# Patient Record
Sex: FEMALE | Employment: OTHER | URBAN - METROPOLITAN AREA
[De-identification: names, ages, dates, MRNs, and addresses within clinical notes are randomized per-mention and may not be internally consistent; named-entity substitution may affect disease eponyms.]

---

## 2024-01-29 ENCOUNTER — MEDICAL CORRESPONDENCE (OUTPATIENT)
Dept: HEALTH INFORMATION MANAGEMENT | Facility: CLINIC | Age: 74
End: 2024-01-29

## 2024-02-26 ENCOUNTER — OFFICE VISIT (OUTPATIENT)
Dept: ANESTHESIOLOGY | Facility: CLINIC | Age: 74
End: 2024-02-26
Payer: COMMERCIAL

## 2024-02-26 VITALS — HEART RATE: 64 BPM | SYSTOLIC BLOOD PRESSURE: 159 MMHG | DIASTOLIC BLOOD PRESSURE: 65 MMHG | OXYGEN SATURATION: 95 %

## 2024-02-26 DIAGNOSIS — M54.16 LUMBAR RADICULOPATHY: Primary | ICD-10-CM

## 2024-02-26 PROCEDURE — 99204 OFFICE O/P NEW MOD 45 MIN: CPT | Performed by: ANESTHESIOLOGY

## 2024-02-26 ASSESSMENT — PAIN SCALES - GENERAL: PAINLEVEL: MODERATE PAIN (4)

## 2024-02-26 NOTE — LETTER
2/26/2024       RE: Beryl Mccoy  [address] [facility] [address]     Dear Colleague,    Thank you for referring your patient, Beryl Mccoy, to the [facility] FOR COMPREHENSIVE PAIN MANAGEMENT [facility]. Please see a copy of my visit note below.      Pain Clinic New Patient Consult Note:    Referring Provider: No ref. provider found dr. Lc Robles  Primary care provider: Vinicius Sales.    Beryl Mccoy is a 73 year old y.o. old female who presents to the pain clinic with low back and leg pain with ambulation.     HPI:  Patient Supplied Answers To the  Pain Questionnaire      2/26/2024     3:26 PM   [facility] -  Patient Entered Questionnaire/Answers   What number best describes your pain right now:  0 = No pain  to  10 = Worst pain imaginable 4   How would you describe the pain burning    sharp    dull, aching    other   Which of the following worsen your pain lying down    standing    walking    exercise   Which of the following improve or reduce your pain sitting    medication   What number best describes your average pain for the past week:  0 = No pain  to  10 = Worst pain imaginable 4   What number best describes your LOWEST pain in past 24 hours:  0 = No pain  to  10 = Worst pain imaginable 3   What number best describes your WORST pain in past 24 hours:  0 = No pain  to  10 = Worst pain imaginable 5   What non-medicine treatments have you already had for your pain physical therapy    spine injections (shots)       She was referred by Dr. Robles for severe spinal stenosis.   She is not able to walk far. She is able to walk about 3/4 of a block. She has to stop. She sits and pain is relieved. The patient reports that walking as her back and legs get tired out. She has had multiple falls over the last year and is very careful in avoiding falls. She thinks that the problem has been going on for years. She was  referred to the [facility] spine surgery clinic by Dr. Robles for severe L2-3 stenosis because of h/o AF and CABG.     Pain treatments:    Herbal medicines: never  Physical therapy: [facility]  Chiropractor: never  Pain physician: Mervat LECHUGA with Dr. Robles 1/4/2024, improved for 1 week. She thinks that the injection helped 75% for 1 week.  Surgery: none  Biofeedback: none  Acupuncture: none    Tests/Imaging reviewed with the patient:    MRI Lumbar spine 1/26/2024  T11-T12: Mild disc bulge. The neural foramen are patent.     T12-L1: The spinal canal and left neural foramen are patent. Facet hypertrophy causes mild right neural foraminal stenosis.     L1-2: Mild disc bulge flattens the ventral thecal sac eccentric to the left. Mild facet hypertrophy. The neural foramen are patent.     L2-3: Grade 1 retrolisthesis of L2 on L3. Disc uncovering/posterior disc bulge with caudally directed right central disc extrusion effaces the ventral thecal sac causing severe spinal canal stenosis. Mild facet hypertrophy. Mild to moderate left neural foraminal stenosis. The right neural foramen is patent.     L3-4: Mild disc bulge with small right subarticular disc protrusion effaces the ventral thecal sac causing narrowing of the right lateral recess and slight dorsal displacement of the traversing right L4 nerve root. Mild to moderate right neural foraminal stenosis. The left neural foramen is patent.     L4-5: Mild disc bulge effaces the ventral thecal sac. Increased to advanced facet hypertrophy. The neural foramen are patent.      L5-S1: Partial lumbarization of L5 on the right with pseudoarthrosis. The spinal canal and right neural foramen are patent. Moderate proximal left neural foraminal stenosis.     IMPRESSION:    1. New severe spinal canal stenosis at the L2-L3 level due to grade 1 retrolisthesis of L2 on L3 combined with a posterior disc bulge and caudally directed right central disc extrusion.   2. Mild disc bulges small  right subarticular disc protrusion effaces the ventral thecal sac at the L3-L4 level causing narrowing of the right lateral recess and increased slight dorsal displacement of the traversing right L4 nerve root.   3. Additional degenerative changes as detailed above.     Significant Medical History:   No past medical history on file.       Past Surgical History:  No past surgical history on file.       Family History:  No family history on file.       Social History:  Social History     Socioeconomic History    Marital status: Single     Spouse name: Not on file    Number of children: Not on file    Years of education: Not on file    Highest education level: Not on file   Occupational History    Not on file   Tobacco Use    Smoking status: Former     Types: Cigarettes    Smokeless tobacco: Never   Substance and Sexual Activity    Alcohol use: Not on file    Drug use: Not on file    Sexual activity: Not on file   Other Topics Concern    Not on file   Social History Narrative    Not on file     Social Determinants of Health     Financial Resource Strain: Not on file   Food Insecurity: Not on file   Transportation Needs: Not on file   Physical Activity: Not on file   Stress: Not on file   Social Connections: Not on file   Interpersonal Safety: Not on file   Housing Stability: Not on file     Social History     Social History Narrative    Not on file          Allergies:  No Known Allergies    Current Medications:   No current outpatient medications on file.          Current Pain Medications:  Medications related to Pain Management (From now, onward)      None               Blood thinner:    plavix    Work History:    Current work status: retired. 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      Physical Exam:     Vitals:    02/26/24 1536   BP: (!) 159/65   BP Location: Right arm   Patient Position: Chair   Cuff Size: Adult Large   Pulse: 64   SpO2: 95%       General Appearance: No distress, seated comfortably  Mood:  "Euthymic  HE ENT: Non constricted pupils  Respiratory: Non labored breathing  Skin: No rashes over exposed skin  MS: LE strength 5/5  Neuro: intact to light touch LE   Gait: non antalgic, ambulates with walker assistance    Laboratory results:  No results for input(s): \"NA\", \"POTASSIUM\", \"CHLORIDE\", \"CO2\", \"ANIONGAP\", \"GLC\", \"BUN\", \"CR\", \"MARLENY\" in the last 30501 hours.    CBC RESULTS: No results for input(s): \"WBC\", \"RBC\", \"HGB\", \"HCT\", \"MCV\", \"MCH\", \"MCHC\", \"RDW\", \"PLT\" in the last 85501 hours.      Imaging:       ASSESSMENT AND PLAN:     Encounter Diagnosis:    Lumbar stenosis and neurogenic claudication  L2-3 stenosis  Lumbar spondylolisthesis    Beryl Mccoy is a 73 year old y.o. old female who presents to the pain clinic with low back and neck pain    I have summarized the patient s past medical history, discussed their clinical findings and the potential differential diagnosis with the patient. Significant past medical history pertinent to the patient s current condition includes multiple falls, pain in the back and legs with ambulation.  The differential diagnosis discussed with the patient are listed above. I have discussed anatomy and possible sources of the pain using models and/or pictures (diagrams). I have discussed multi- disciplinary pain management options withthe patient as pertaining to their case as detailed above. The pain management options we discussed included, but were not limited to the recommendations below.  I also discussed with patient the risks, benefits and alternatives to each pain management option.  All of the patient s questions and concerns were answered to the best of my ability.    RECOMMENDATIONS:     1. Procedure: We are scheduling the patient for L3-4 interlaminar epidural steroid injection with fluoroscopy can be considered once the patient meets with neurosurgery in the procedure suite. Risks/benefits/alternatives were discussed.     I also discussed with the patient that " the possible risks involved with interventional treatment included, but are not limited to, no pain control, worsened pain, stroke,seizure, spinal headache, allergic reactions, introduction of infection or bleeding which may lead to emergent spine surgery, nerve damage, paralysis oreven death.    2. Physical therapy: I have refered the patient for outpatient physical therapy for stretching, strengthening and home exercise program for myofascial neck and low back pain. The patient will also discuss spine care and posture. Pool therapy and stretches can be considered if available.    3. We are contacting neurosurgery to accommodate the patient for a sooner appointment as the visit in the pain clinic was a scheduling error.     Follow up: after neurosurgery    I personally spoke to neurosurgery coordinator to try to facilitate an earlier consultation for the patient in their clinic.         Again, thank you for allowing me to participate in the care of your patient.      Sincerely,    Mayra Chatman MD

## 2024-02-26 NOTE — PROGRESS NOTES
Pain Clinic New Patient Consult Note:    Referring Provider: No ref. provider found dr. Lc Robles  Primary care provider: Vinicius Sales.    Beryl Mccoy is a 73 year old y.o. old female who presents to the pain clinic with low back and leg pain with ambulation.     HPI:  Patient Supplied Answers To the  Pain Questionnaire      2/26/2024     3:26 PM    Pain -  Patient Entered Questionnaire/Answers   What number best describes your pain right now:  0 = No pain  to  10 = Worst pain imaginable 4   How would you describe the pain burning    sharp    dull, aching    other   Which of the following worsen your pain lying down    standing    walking    exercise   Which of the following improve or reduce your pain sitting    medication   What number best describes your average pain for the past week:  0 = No pain  to  10 = Worst pain imaginable 4   What number best describes your LOWEST pain in past 24 hours:  0 = No pain  to  10 = Worst pain imaginable 3   What number best describes your WORST pain in past 24 hours:  0 = No pain  to  10 = Worst pain imaginable 5   What non-medicine treatments have you already had for your pain physical therapy    spine injections (shots)       She was referred by Dr. Robles for severe spinal stenosis.   She is not able to walk far. She is able to walk about 3/4 of a block. She has to stop. She sits and pain is relieved. The patient reports that walking as her back and legs get tired out. She has had multiple falls over the last year and is very careful in avoiding falls. She thinks that the problem has been going on for years. She was referred to the [facility] spine surgery clinic by Dr. Robles for severe L2-3 stenosis because of h/o AF and CABG.     Pain treatments:    Herbal medicines: never  Physical therapy: [facility]  Chiropractor: never  Pain physician: Mervat LECHUGA with Dr. Robles 1/4/2024, improved for 1 week. She thinks that the injection helped 75% for 1  week.  Surgery: none  Biofeedback: none  Acupuncture: none    Tests/Imaging reviewed with the patient:    MRI Lumbar spine 1/26/2024  T11-T12: Mild disc bulge. The neural foramen are patent.     T12-L1: The spinal canal and left neural foramen are patent. Facet hypertrophy causes mild right neural foraminal stenosis.     L1-2: Mild disc bulge flattens the ventral thecal sac eccentric to the left. Mild facet hypertrophy. The neural foramen are patent.     L2-3: Grade 1 retrolisthesis of L2 on L3. Disc uncovering/posterior disc bulge with caudally directed right central disc extrusion effaces the ventral thecal sac causing severe spinal canal stenosis. Mild facet hypertrophy. Mild to moderate left neural foraminal stenosis. The right neural foramen is patent.     L3-4: Mild disc bulge with small right subarticular disc protrusion effaces the ventral thecal sac causing narrowing of the right lateral recess and slight dorsal displacement of the traversing right L4 nerve root. Mild to moderate right neural foraminal stenosis. The left neural foramen is patent.     L4-5: Mild disc bulge effaces the ventral thecal sac. Increased to advanced facet hypertrophy. The neural foramen are patent.      L5-S1: Partial lumbarization of L5 on the right with pseudoarthrosis. The spinal canal and right neural foramen are patent. Moderate proximal left neural foraminal stenosis.     IMPRESSION:    1. New severe spinal canal stenosis at the L2-L3 level due to grade 1 retrolisthesis of L2 on L3 combined with a posterior disc bulge and caudally directed right central disc extrusion.   2. Mild disc bulges small right subarticular disc protrusion effaces the ventral thecal sac at the L3-L4 level causing narrowing of the right lateral recess and increased slight dorsal displacement of the traversing right L4 nerve root.   3. Additional degenerative changes as detailed above.     Significant Medical History:   No past medical history on  "file.       Past Surgical History:  No past surgical history on file.       Family History:  No family history on file.       Social History:  Social History     Socioeconomic History    Marital status: Single     Spouse name: Not on file    Number of children: Not on file    Years of education: Not on file    Highest education level: Not on file   Occupational History    Not on file   Tobacco Use    Smoking status: Former     Types: Cigarettes    Smokeless tobacco: Never   Substance and Sexual Activity    Alcohol use: Not on file    Drug use: Not on file    Sexual activity: Not on file   Other Topics Concern    Not on file   Social History Narrative    Not on file     Social Determinants of Health     Financial Resource Strain: Not on file   Food Insecurity: Not on file   Transportation Needs: Not on file   Physical Activity: Not on file   Stress: Not on file   Social Connections: Not on file   Interpersonal Safety: Not on file   Housing Stability: Not on file     Social History     Social History Narrative    Not on file          Allergies:  No Known Allergies    Current Medications:   No current outpatient medications on file.          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      Physical Exam:     Vitals:    02/26/24 1536   BP: (!) 159/65   BP Location: Right arm   Patient Position: Chair   Cuff Size: Adult Large   Pulse: 64   SpO2: 95%       General Appearance: No distress, seated comfortably  Mood: Euthymic  HE ENT: Non constricted pupils  Respiratory: Non labored breathing  Skin: No rashes over exposed skin  MS: LE strength 5/5  Neuro: intact to light touch LE   Gait: non antalgic, ambulates with walker assistance    Laboratory results:  No results for input(s): \"NA\", \"POTASSIUM\", \"CHLORIDE\", \"CO2\", \"ANIONGAP\", \"GLC\", \"BUN\", " "\"CR\", \"MARLENY\" in the last 87249 hours.    CBC RESULTS: No results for input(s): \"WBC\", \"RBC\", \"HGB\", \"HCT\", \"MCV\", \"MCH\", \"MCHC\", \"RDW\", \"PLT\" in the last 28161 hours.      Imaging:       ASSESSMENT AND PLAN:     Encounter Diagnosis:    Lumbar stenosis and neurogenic claudication  L2-3 stenosis  Lumbar spondylolisthesis    Beryl Mccoy is a 73 year old y.o. old female who presents to the pain clinic with low back and neck pain    I have summarized the patient s past medical history, discussed their clinical findings and the potential differential diagnosis with the patient. Significant past medical history pertinent to the patient s current condition includes multiple falls, pain in the back and legs with ambulation.  The differential diagnosis discussed with the patient are listed above. I have discussed anatomy and possible sources of the pain using models and/or pictures (diagrams). I have discussed multi- disciplinary pain management options withthe patient as pertaining to their case as detailed above. The pain management options we discussed included, but were not limited to the recommendations below.  I also discussed with patient the risks, benefits and alternatives to each pain management option.  All of the patient s questions and concerns were answered to the best of my ability.    RECOMMENDATIONS:     1. Procedure: We are scheduling the patient for L3-4 interlaminar epidural steroid injection with fluoroscopy can be considered once the patient meets with neurosurgery in the procedure suite. Risks/benefits/alternatives were discussed.     I also discussed with the patient that the possible risks involved with interventional treatment included, but are not limited to, no pain control, worsened pain, stroke,seizure, spinal headache, allergic reactions, introduction of infection or bleeding which may lead to emergent spine surgery, nerve damage, paralysis oreven death.    2. Physical therapy: I have refered " the patient for outpatient physical therapy for stretching, strengthening and home exercise program for myofascial neck and low back pain. The patient will also discuss spine care and posture. Pool therapy and stretches can be considered if available.    3. We are contacting neurosurgery to accommodate the patient for a sooner appointment as the visit in the pain clinic was a scheduling error.     Follow up: after neurosurgery    I personally spoke to neurosurgery coordinator to try to facilitate an earlier consultation for the patient in their clinic.

## 2024-02-26 NOTE — PATIENT INSTRUCTIONS
Referrals:    Physical Therapy Referral placed-   If you have not heard from the scheduling office within 2 business days, please call [phone] for all locations       Recommended Follow up:      Follow up as needed after Neurosurgery Consult.           To speak with a nurse, schedule/reschedule/cancel a clinic appointment, or request a medication refill call: [phone]    You can also reach us by AIM: [email or web address]

## 2024-02-27 ENCOUNTER — CARE COORDINATION (OUTPATIENT)
Dept: NEUROSURGERY | Facility: CLINIC | Age: 74
End: 2024-02-27
Payer: COMMERCIAL

## 2024-02-27 DIAGNOSIS — M48.061 LUMBAR STENOSIS: Primary | ICD-10-CM

## 2024-02-27 NOTE — PROGRESS NOTES
Writer spoke [facility] for patient imaging to be pushed to [facility], reports in Care Everywhere.     Donita Anthony LPN  Neurosurgery

## 2024-02-28 ENCOUNTER — TELEPHONE (OUTPATIENT)
Dept: NEUROSURGERY | Facility: CLINIC | Age: 74
End: 2024-02-28
Payer: COMMERCIAL

## 2024-02-28 NOTE — TELEPHONE ENCOUNTER
SPINE PATIENTS - NEW PROTOCOL PREVISIT    RECORDS RECEIVED FROM: Care Everywhere   REASON FOR VISIT: Lumbar stenosis; (from [facility]); Epic/Needs images pushed form [facility]; XR prior   PROVIDER: Radha Davidson MD   DATE OF APPT: 2/29/24 @ 8:15 am    NOTES (FOR ALL VISITS) STATUS DETAILS   OFFICE NOTE from referring provider Internal 2/26/24 Mayra Chatman MD @[facility]     OFFICE NOTE from other specialist Care Everywhere 2/9/24, 12/15/23 Lc Robles MD  @[facility]     MEDICATION LIST Care Everywhere    IMAGING  (FOR ALL VISITS)     MRI (HEAD, NECK, SPINE) Received [facility]  1/31/24 MR Thoracic Spine  1/26/24 MR Lumbar Spine     XRAY (SPINE) *NEUROSURGERY* In process [facility]  Scheduled 2/29/24 XR EOS Total Body     CT (HEAD, NECK, SPINE) Received [facility]  5/31/22 CT Lumbar Spine

## 2024-02-29 ENCOUNTER — ANCILLARY PROCEDURE (OUTPATIENT)
Dept: GENERAL RADIOLOGY | Facility: CLINIC | Age: 74
End: 2024-02-29
Attending: NEUROLOGICAL SURGERY
Payer: COMMERCIAL

## 2024-02-29 ENCOUNTER — OFFICE VISIT (OUTPATIENT)
Dept: NEUROSURGERY | Facility: CLINIC | Age: 74
End: 2024-02-29
Payer: COMMERCIAL

## 2024-02-29 ENCOUNTER — PRE VISIT (OUTPATIENT)
Dept: NEUROSURGERY | Facility: CLINIC | Age: 74
End: 2024-02-29

## 2024-02-29 VITALS
DIASTOLIC BLOOD PRESSURE: 74 MMHG | SYSTOLIC BLOOD PRESSURE: 151 MMHG | WEIGHT: 249 LBS | BODY MASS INDEX: 44.12 KG/M2 | HEART RATE: 56 BPM | RESPIRATION RATE: 16 BRPM | OXYGEN SATURATION: 96 % | HEIGHT: 63 IN

## 2024-02-29 DIAGNOSIS — M51.26 LUMBAR DISC HERNIATION: ICD-10-CM

## 2024-02-29 DIAGNOSIS — E66.01 MORBID OBESITY WITH BMI OF 40.0-44.9, ADULT (H): Primary | ICD-10-CM

## 2024-02-29 DIAGNOSIS — R27.0 ATAXIA: ICD-10-CM

## 2024-02-29 DIAGNOSIS — M48.061 LUMBAR STENOSIS: ICD-10-CM

## 2024-02-29 PROCEDURE — 99204 OFFICE O/P NEW MOD 45 MIN: CPT | Performed by: NEUROLOGICAL SURGERY

## 2024-02-29 PROCEDURE — 77073 BONE LENGTH STUDIES: CPT | Performed by: STUDENT IN AN ORGANIZED HEALTH CARE EDUCATION/TRAINING PROGRAM

## 2024-02-29 PROCEDURE — 72082 X-RAY EXAM ENTIRE SPI 2/3 VW: CPT | Performed by: STUDENT IN AN ORGANIZED HEALTH CARE EDUCATION/TRAINING PROGRAM

## 2024-02-29 RX ORDER — HYDRALAZINE HYDROCHLORIDE 50 MG/1
50 TABLET, FILM COATED ORAL
COMMUNITY
Start: 2023-12-22 | End: 2024-12-21

## 2024-02-29 RX ORDER — LOSARTAN POTASSIUM 100 MG/1
100 TABLET ORAL DAILY
COMMUNITY
Start: 2024-01-30

## 2024-02-29 RX ORDER — TORSEMIDE 20 MG/1
20 TABLET ORAL DAILY
COMMUNITY
Start: 2024-01-30 | End: 2025-01-29

## 2024-02-29 RX ORDER — SPIRONOLACTONE 25 MG/1
1 TABLET ORAL DAILY
COMMUNITY
Start: 2023-12-07 | End: 2024-12-06

## 2024-02-29 RX ORDER — LAMOTRIGINE 100 MG/1
1.5 TABLET ORAL DAILY
COMMUNITY
Start: 2024-02-13

## 2024-02-29 RX ORDER — LETROZOLE 2.5 MG/1
2.5 TABLET, FILM COATED ORAL DAILY
COMMUNITY
Start: 2024-02-19

## 2024-02-29 RX ORDER — ATORVASTATIN CALCIUM 80 MG/1
1 TABLET, FILM COATED ORAL DAILY
COMMUNITY
Start: 2023-01-24

## 2024-02-29 RX ORDER — SERTRALINE HYDROCHLORIDE 100 MG/1
100 TABLET, FILM COATED ORAL DAILY
COMMUNITY
Start: 2024-02-13

## 2024-02-29 RX ORDER — SOTALOL HYDROCHLORIDE 120 MG/1
120 TABLET ORAL
COMMUNITY
Start: 2023-07-17 | End: 2024-07-16

## 2024-02-29 RX ORDER — POTASSIUM CHLORIDE 1500 MG/1
0.5 TABLET, EXTENDED RELEASE ORAL DAILY
COMMUNITY
Start: 2023-12-07

## 2024-02-29 NOTE — PATIENT INSTRUCTIONS
Dr. Davidson placed referrals for Weight Management (your goal BMI is 35, weight about 200 pounds for surgery consideration for your disc herniation), physical therapy to help your walking, and Neurology evaluation to evaluate your imbalance. They will call you to schedule.

## 2024-02-29 NOTE — PROGRESS NOTES
"    Neurosurgery Clinic Note    Chief Complaint: \"I feel wobbly\"    History of Present Illness:  It was a pleasure to evaluate Beryl Mccoy in clinic today   Beryl Mccoy is a 73 year old female presenting with sensation that she is losing her balance when standing and also feels \"wobbly\" when walking, this has been present for about 3 years. She had numbness on bottoms of feet since having chemotherapy for breast cancer about 5 years ago per her report. She has occasional shooting pain in both thighs but this does not happen daily. She also has chronic pain in thoracolumbar spine, only has had one session of PT which she stopped because she didn't think it helped her. Had an JACQUELINE last month that helped her back pain for about 2 weeks. She has no hand numbness or clumsiness or tingling. Her legs feel heavy if she walks for a while, but her gait imbalance starts immediately upon walking, and is present when she is static/standing. She does not feel any incoordination in her arms.  No prior spine surgery.      Notable history for breast cancer, bipolar disease, paroxysmal atrial fibrillation, obesity  Former smoker        IMAGING per my own measurement and interpretation:  Xrays:EOS 02/29/24  Transitional lumbosacral joint anatomy  No significant sagittal or coronal malalignment          MRI thoracic spine 1/13/24 no significant stenosis and MRI lumbar spine 1/26/24 TRANSITIONAL LUMBAR SACRAL SEGMENT; large right L2-3 disc extrusion with moderately severe central and right lateral recess stenosis; this is chronic based on CT lumbar spine from 2022 which showed calcification in the area of current disc herniation        Resulted Imaging/Labs:  MR Thoracic Spine w/o & w Contrast    Result Date: 2/1/2024  INDICATION: mid back pain, chronic TECHNIQUE:  MRI of the thoracic spine with and without contrast, 10 mL  GADOBUTROL 1 MMOL/ML IV SOLN. COMPARISON:  None.         FINDINGS:  Normal alignment.  Normal cord signal.  " Hemangiomas within the T1, T3, and T10 vertebral bodies. Mild thoracic curve convex right.  Mild disc bulges flatten the ventral thecal sac at the C6-C7 and C7-T1 levels. Facet hypertrophy causes mild right T3-4, T4-T5, T5-T6, and T6-T7 neural foraminal narrowing.  Mild disc bulge effaces the ventral thecal sac at the T10-T11 level.  The visualized paraspinal structures are unremarkable. IMPRESSION:   1. Mild disc bulge effaces the ventral thecal sac at the T10-T11 level. 2. Mild disc bulges flatten the ventral thecal sac at the C6-C7 and C7-T1 levels. 3. Facet hypertrophy causes mild right T3-T4, T4-T5, T5-T6, and T6-T7 neural foraminal narrowing. 4. No abnormal signal or enhancement within the thoracic spinal cord.    MR Lumbar Spine w/o & w Contrast    Result Date: 1/26/2024  INDICATION: evaluation of stenosis   TECHNIQUE:  MRI of the lumbar spine with and without contrast, GADOBUTROL 1 MMOL/ML IV SOLN 10 mL. COMPARISON:  07/08/2014       FINDINGS: Sagittal:  Transitional lumbosacral anatomy with partial sacralization of the labeled L5 segment.  The conus medullaris terminates at the level of the L1 vertebral body. Normal cord signal.  Degenerative disc disease with loss of disc hydration signal and disc space height with associated Modic type II degenerative endplate changes at the L2-L3 level.  Grade 1 retrolisthesis of L2 on L3 is new.  Normal enhancement. Left renal cyst. Axial: T11-T12: Mild disc bulge. The neural foramen are patent. T12-L1: The spinal canal and left neural foramen are patent. Facet hypertrophy causes mild right neural foraminal stenosis. L1-2: Mild disc bulge flattens the ventral thecal sac eccentric to the left. Mild facet hypertrophy. The neural foramen are patent. L2-3: Grade 1 retrolisthesis of L2 on L3. Disc uncovering/posterior disc bulge with caudally directed right central disc extrusion effaces the ventral thecal sac causing severe spinal canal stenosis. Mild facet hypertrophy.  Mild to moderate left neural foraminal stenosis. The right neural foramen is patent. L3-4: Mild disc bulge with small right subarticular disc protrusion effaces the ventral thecal sac causing narrowing of the right lateral recess and slight dorsal displacement of the traversing right L4 nerve root. Mild to moderate right neural foraminal stenosis. The left neural foramen is patent. L4-5: Mild disc bulge effaces the ventral thecal sac. Increased to advanced facet hypertrophy. The neural foramen are patent.   L5-S1: Partial lumbarization of L5 on the right with pseudoarthrosis. The spinal canal and right neural foramen are patent. Moderate proximal left neural foraminal stenosis. IMPRESSION:   1. New severe spinal canal stenosis at the L2-L3 level due to grade 1 retrolisthesis of L2 on L3 combined with a posterior disc bulge and caudally directed right central disc extrusion. 2. Mild disc bulges small right subarticular disc protrusion effaces the ventral thecal sac at the L3-L4 level causing narrowing of the right lateral recess and increased slight dorsal displacement of the traversing right L4 nerve root. 3. Additional degenerative changes as detailed above. Comment: Many lumbar spine MRI findings are so common that while we may have reported their presence, they must be interpreted with caution and in the context of the clinical situation. The frequency of these findings in adults WITHOUT low back pain increases with age and are as follows: disk degeneration (37-96%), disk height loss (24-84%), disk bulge (30-84%), disk protrusion (29-43%), annular fissure (19-29%), and facet degeneration (4-83%). Frequency percentages adapted from Chelo W, Miley PH, William B, et al. AJNR AM J Neuroradiol 2015:36:811-16.  CT Lumbar Spine w/o Contrast    Result Date: 5/31/2022  EXAM: CT LUMBAR SPINE WO IV CONT LOCATION: [facility] DATE/TIME: 5/31/2022 1:06 PM INDICATION: Traumatic lumbar spine injury. COMPARISON:  "None. TECHNIQUE: Routine CT Lumbar Spine without IV contrast. Multiplanar reformats. Dose reduction techniques were used. FINDINGS: VERTEBRA: Transitional L5 vertebral body. Normal vertebral body heights. No fracture or posttraumatic subluxation. CANAL/FORAMINA: Severe L2-L3 spinal canal stenosis largely secondary to a caudally directed right central disc herniation which is partially calcified. Moderate right L3-L4 lateral recess stenosis with mild neural foraminal stenosis due to a chronic disc osteophyte complex. Moderate to severe chronic left L5-S1 neural foraminal stenosis. PARASPINAL: No extraspinal abnormality. IMPRESSION: 1.  No acute abnormality. 2.  Severe L2-L3 spinal canal stenosis largely secondary to a caudally directed right central disc herniation. 3.  Moderate right L3-L4 lateral recess stenosis. 4.  Moderate to severe chronic left L5-S1 neural foraminal stenosis.          Vitamin D:  Vitamin D Deficiency Screening Results:  No results found for: \"VITDT\"  No results found for: \"CMB537\", \"URYR381\", \"[id number]\", \"VITD3\", \"D2VIT\", \"D3VIT\", \"DTOT\", \"[id number]\", \"[id number]\", \"[id number]\", \"[id number]\", \"[id number]\", \"[id number]\"      Nutritional Status:  Estimated body mass index is 44.11 kg/m  as calculated from the following:    Height as of this encounter: 1.6 m (5' 3\").    Weight as of this encounter: 112.9 kg (249 lb).    No results found for: \"ALBUMIN\"    Diabetes Screening:  No results found for: \"A1C\"    Nicotine Usage:    No       Physical Exam   BP (!) 151/74   Pulse 56   Resp 16   Ht 1.6 m (5' 3\")   Wt 112.9 kg (249 lb)   SpO2 96%   BMI 44.11 kg/m      Constitutional: Oriented to person, place, and time. Appears well-developed and well-nourished. Cooperative. No distress.     Neurological: alert and oriented to person, place, and time.   sensory deficit bottoms of feet bilaterally  Gait normal tandem walk, unable to narrow stance and toe-heel walk  B    Reflex Scores: 1+ bilateral " "biceps, brachioradialis, 0 patellar, Achilles  STRENGTH LEFT RIGHT   Deltoid 5 5   Bicep 5 5   Wrist Extensor 5 5   Tricep 5 5   Finger flexion 5 5   Finger abduction 5 5    5 5       Hip Flexion     5     5   Knee Extension 5 5   Ankle Dorsiflexion 5 5   Extensor Hallucis Longus 5 5   Plantar Flexion 5 5   Foot eversion 5 5   Foot inversion 5 5     No Lhermitte's, No Spurling's  No Bo's   No ankle clonus  Unable to tandem walk        Skin: Skin is warm, dry and intact.   Psychiatric: Normal mood and affect. Speech is normal and behavior is normal.        ASSESSMENT:  Beryl Mccoy is a 73 year old female with morbid obesity, L2-3 chronic calcified disc herniation with stenosis, imbalance, history of breast cancer and chemotherapy.    PLAN:  While the Radiology report from MRI 1/26/24 lumbar spine says \"new\" L2-3 stenosis, in fact this is not accurate and the L2-3 disc herniation is chronic, because as far back as 2022 there is a CT scan which shows calcified disc herniation in this region with \"severe\" stenosis on prior CT lumbar spine read of 2022 which I confirmed with imaging review.    The patient has significant modifiable surgical risk factor of morbid obesity, with a current BMI of greater than 44. Because this is a chronic disc herniation present at least since 2022 and likely well before then as it was calcified in 2022, I recommend weight loss to reduce risk of medical and surgical complication prior to consideration of spine surgery. BMI goal of 35 for this patient is weight about 200 pounds.  Patient's daughter discussed with patient that many doctors have previously recommended weight loss and increased activity.    I discussed with patient and daughter that her gait imbalance is not likely due to lumbar spine and there is no thoracic spinal stenosis to explain this either. She does not have any upper extremity symptoms to suggest cervical myelopathy. I recommended Neurology referral for " further evaluation of her sensation that she is going to fall backwards while standing still, and cannot heel-toe walk.    Also referral for gait-based PT.    Referral made for weight management services.  Return to clinic once BMI 35 for further discussion for surgery for disc herniation; we discussed that I would not expect balance improvement with this surgery, and surgery would be for sensation of leg heaviness as well as shooting leg pain.      Radha Davidson MD    [facility] Department of Neurosurgery  Complex Spinal Deformity, Scoliosis, and Minimally Invasive Spine Surgery Specialist  Office: [phone]    2/29/2024      I spent 49 minutes (8:12am-9:01am) in patient care with greater than 50% spent in counseling and/or coordination of care.

## 2024-02-29 NOTE — LETTER
"2/29/2024       RE: Beryl Mccoy  [address] [address]       Dear Colleague,    Thank you for referring your patient, Beryl Mccoy, to the [facility]UROSURG[facility] [place] [facility]. Please see a copy of my visit note below.        Neurosurgery Clinic Note    Chief Complaint: \"I feel wobbly\"    History of Present Illness:  It was a pleasure to evaluate Beryl Mccoy in clinic today   Beryl Mccoy is a 73 year old female presenting with sensation that she is losing her balance when standing and also feels \"wobbly\" when walking, this has been present for about 3 years. She had numbness on bottoms of feet since having chemotherapy for breast cancer about 5 years ago per her report. She has occasional shooting pain in both thighs but this does not happen daily. She also has chronic pain in thoracolumbar spine, only has had one session of PT which she stopped because she didn't think it helped her. Had an JACQUELINE last month that helped her back pain for about 2 weeks. She has no hand numbness or clumsiness or tingling. Her legs feel heavy if she walks for a while, but her gait imbalance starts immediately upon walking, and is present when she is static/standing. She does not feel any incoordination in her arms.  No prior spine surgery.      Notable history for breast cancer, bipolar disease, paroxysmal atrial fibrillation, obesity  Former smoker        IMAGING per my own measurement and interpretation:  Xrays:EOS 02/29/24  Transitional lumbosacral joint anatomy  No significant sagittal or coronal malalignment          MRI thoracic spine 1/13/24 no significant stenosis and MRI lumbar spine 1/26/24 TRANSITIONAL LUMBAR SACRAL SEGMENT; large right L2-3 disc extrusion with moderately severe central and right lateral recess stenosis; this is chronic based on CT lumbar spine from 2022 which showed calcification in the area of current disc " herniation        Resulted Imaging/Labs:  MR Thoracic Spine w/o & w Contrast    Result Date: 2/1/2024  INDICATION: mid back pain, chronic TECHNIQUE:  MRI of the thoracic spine with and without contrast, 10 mL  GADOBUTROL 1 MMOL/ML IV SOLN. COMPARISON:  None.         FINDINGS:  Normal alignment.  Normal cord signal.  Hemangiomas within the T1, T3, and T10 vertebral bodies. Mild thoracic curve convex right.  Mild disc bulges flatten the ventral thecal sac at the C6-C7 and C7-T1 levels. Facet hypertrophy causes mild right T3-4, T4-T5, T5-T6, and T6-T7 neural foraminal narrowing.  Mild disc bulge effaces the ventral thecal sac at the T10-T11 level.  The visualized paraspinal structures are unremarkable. IMPRESSION:   1. Mild disc bulge effaces the ventral thecal sac at the T10-T11 level. 2. Mild disc bulges flatten the ventral thecal sac at the C6-C7 and C7-T1 levels. 3. Facet hypertrophy causes mild right T3-T4, T4-T5, T5-T6, and T6-T7 neural foraminal narrowing. 4. No abnormal signal or enhancement within the thoracic spinal cord.    MR Lumbar Spine w/o & w Contrast    Result Date: 1/26/2024  INDICATION: evaluation of stenosis   TECHNIQUE:  MRI of the lumbar spine with and without contrast, GADOBUTROL 1 MMOL/ML IV SOLN 10 mL. COMPARISON:  07/08/2014       FINDINGS: Sagittal:  Transitional lumbosacral anatomy with partial sacralization of the labeled L5 segment.  The conus medullaris terminates at the level of the L1 vertebral body. Normal cord signal.  Degenerative disc disease with loss of disc hydration signal and disc space height with associated Modic type II degenerative endplate changes at the L2-L3 level.  Grade 1 retrolisthesis of L2 on L3 is new.  Normal enhancement. Left renal cyst. Axial: T11-T12: Mild disc bulge. The neural foramen are patent. T12-L1: The spinal canal and left neural foramen are patent. Facet hypertrophy causes mild right neural foraminal stenosis. L1-2: Mild disc bulge flattens the  ventral thecal sac eccentric to the left. Mild facet hypertrophy. The neural foramen are patent. L2-3: Grade 1 retrolisthesis of L2 on L3. Disc uncovering/posterior disc bulge with caudally directed right central disc extrusion effaces the ventral thecal sac causing severe spinal canal stenosis. Mild facet hypertrophy. Mild to moderate left neural foraminal stenosis. The right neural foramen is patent. L3-4: Mild disc bulge with small right subarticular disc protrusion effaces the ventral thecal sac causing narrowing of the right lateral recess and slight dorsal displacement of the traversing right L4 nerve root. Mild to moderate right neural foraminal stenosis. The left neural foramen is patent. L4-5: Mild disc bulge effaces the ventral thecal sac. Increased to advanced facet hypertrophy. The neural foramen are patent.   L5-S1: Partial lumbarization of L5 on the right with pseudoarthrosis. The spinal canal and right neural foramen are patent. Moderate proximal left neural foraminal stenosis. IMPRESSION:   1. New severe spinal canal stenosis at the L2-L3 level due to grade 1 retrolisthesis of L2 on L3 combined with a posterior disc bulge and caudally directed right central disc extrusion. 2. Mild disc bulges small right subarticular disc protrusion effaces the ventral thecal sac at the L3-L4 level causing narrowing of the right lateral recess and increased slight dorsal displacement of the traversing right L4 nerve root. 3. Additional degenerative changes as detailed above. Comment: Many lumbar spine MRI findings are so common that while we may have reported their presence, they must be interpreted with caution and in the context of the clinical situation. The frequency of these findings in adults WITHOUT low back pain increases with age and are as follows: disk degeneration (37-96%), disk height loss (24-84%), disk bulge (30-84%), disk protrusion (29-43%), annular fissure (19-29%), and facet degeneration (4-83%).  "Frequency percentages adapted from Chelo W, Miley PH, William B, et al. AJNR AM J Neuroradiol 2015:36:811-16.  CT Lumbar Spine w/o Contrast    Result Date: 5/31/2022  EXAM: CT LUMBAR SPINE WO IV CONT LOCATION: [facility] DATE/TIME: 5/31/2022 1:06 PM INDICATION: Traumatic lumbar spine injury. COMPARISON: None. TECHNIQUE: Routine CT Lumbar Spine without IV contrast. Multiplanar reformats. Dose reduction techniques were used. FINDINGS: VERTEBRA: Transitional L5 vertebral body. Normal vertebral body heights. No fracture or posttraumatic subluxation. CANAL/FORAMINA: Severe L2-L3 spinal canal stenosis largely secondary to a caudally directed right central disc herniation which is partially calcified. Moderate right L3-L4 lateral recess stenosis with mild neural foraminal stenosis due to a chronic disc osteophyte complex. Moderate to severe chronic left L5-S1 neural foraminal stenosis. PARASPINAL: No extraspinal abnormality. IMPRESSION: 1.  No acute abnormality. 2.  Severe L2-L3 spinal canal stenosis largely secondary to a caudally directed right central disc herniation. 3.  Moderate right L3-L4 lateral recess stenosis. 4.  Moderate to severe chronic left L5-S1 neural foraminal stenosis.          Vitamin D:  Vitamin D Deficiency Screening Results:  No results found for: \"VITDT\"  No results found for: \"EWS996\", \"GJPB238\", \"GHSH70EEZEQ\", \"VITD3\", \"D2VIT\", \"D3VIT\", \"DTOT\", \"[id number]\", \"[id number]\", \"[id number]\", \"[id number]\", \"[id number]\", \"[id number]\"      Nutritional Status:  Estimated body mass index is 44.11 kg/m  as calculated from the following:    Height as of this encounter: 1.6 m (5' 3\").    Weight as of this encounter: 112.9 kg (249 lb).    No results found for: \"ALBUMIN\"    Diabetes Screening:  No results found for: \"A1C\"    Nicotine Usage:    No       Physical Exam   BP (!) 151/74   Pulse 56   Resp 16   Ht 1.6 m (5' 3\")   Wt 112.9 kg (249 lb)   SpO2 96%   BMI 44.11 kg/m      Constitutional: " "Oriented to person, place, and time. Appears well-developed and well-nourished. Cooperative. No distress.     Neurological: alert and oriented to person, place, and time.   sensory deficit bottoms of feet bilaterally  Gait normal tandem walk, unable to narrow stance and toe-heel walk  B    Reflex Scores: 1+ bilateral biceps, brachioradialis, 0 patellar, Achilles  STRENGTH LEFT RIGHT   Deltoid 5 5   Bicep 5 5   Wrist Extensor 5 5   Tricep 5 5   Finger flexion 5 5   Finger abduction 5 5    5 5       Hip Flexion     5     5   Knee Extension 5 5   Ankle Dorsiflexion 5 5   Extensor Hallucis Longus 5 5   Plantar Flexion 5 5   Foot eversion 5 5   Foot inversion 5 5     No Lhermitte's, No Spurling's  No Bo's   No ankle clonus  Unable to tandem walk        Skin: Skin is warm, dry and intact.   Psychiatric: Normal mood and affect. Speech is normal and behavior is normal.        ASSESSMENT:  Beryl Mccoy is a 73 year old female with morbid obesity, L2-3 chronic calcified disc herniation with stenosis, imbalance, history of breast cancer and chemotherapy.    PLAN:  While the Radiology report from MRI 1/26/24 lumbar spine says \"new\" L2-3 stenosis, in fact this is not accurate and the L2-3 disc herniation is chronic, because as far back as 2022 there is a CT scan which shows calcified disc herniation in this region with \"severe\" stenosis on prior CT lumbar spine read of 2022 which I confirmed with imaging review.    The patient has significant modifiable surgical risk factor of morbid obesity, with a current BMI of greater than 44. Because this is a chronic disc herniation present at least since 2022 and likely well before then as it was calcified in 2022, I recommend weight loss to reduce risk of medical and surgical complication prior to consideration of spine surgery. BMI goal of 35 for this patient is weight about 200 pounds.  Patient's daughter discussed with patient that many doctors have previously recommended " weight loss and increased activity.    I discussed with patient and daughter that her gait imbalance is not likely due to lumbar spine and there is no thoracic spinal stenosis to explain this either. She does not have any upper extremity symptoms to suggest cervical myelopathy. I recommended Neurology referral for further evaluation of her sensation that she is going to fall backwards while standing still, and cannot heel-toe walk.    Also referral for gait-based PT.    Referral made for weight management services.  Return to clinic once BMI 35 for further discussion for surgery for disc herniation; we discussed that I would not expect balance improvement with this surgery, and surgery would be for sensation of leg heaviness as well as shooting leg pain.      I spent 49 minutes (8:12am-9:01am) in patient care with greater than 50% spent in counseling and/or coordination of care.        Again, thank you for allowing me to participate in the care of your patient.      Sincerely,    Radha Davidson MD

## 2024-03-19 ENCOUNTER — VIRTUAL VISIT (OUTPATIENT)
Dept: ENDOCRINOLOGY | Facility: CLINIC | Age: 74
End: 2024-03-19
Attending: NEUROLOGICAL SURGERY
Payer: COMMERCIAL

## 2024-03-19 ENCOUNTER — TELEPHONE (OUTPATIENT)
Dept: ENDOCRINOLOGY | Facility: CLINIC | Age: 74
End: 2024-03-19

## 2024-03-19 VITALS — BODY MASS INDEX: 42.52 KG/M2 | HEIGHT: 63 IN | WEIGHT: 240 LBS

## 2024-03-19 DIAGNOSIS — E66.813 CLASS 3 SEVERE OBESITY WITH SERIOUS COMORBIDITY AND BODY MASS INDEX (BMI) OF 40.0 TO 44.9 IN ADULT, UNSPECIFIED OBESITY TYPE (H): Primary | ICD-10-CM

## 2024-03-19 DIAGNOSIS — R73.03 PRE-DIABETES: ICD-10-CM

## 2024-03-19 DIAGNOSIS — E66.01 CLASS 3 SEVERE OBESITY WITH SERIOUS COMORBIDITY AND BODY MASS INDEX (BMI) OF 40.0 TO 44.9 IN ADULT, UNSPECIFIED OBESITY TYPE (H): Primary | ICD-10-CM

## 2024-03-19 PROBLEM — C77.3 BREAST CANCER METASTASIZED TO AXILLARY LYMPH NODE, LEFT (H): Status: ACTIVE | Noted: 2017-11-03

## 2024-03-19 PROBLEM — F90.0 ATTENTION DEFICIT HYPERACTIVITY DISORDER (ADHD), INATTENTIVE TYPE, MODERATE: Status: ACTIVE | Noted: 2017-07-18

## 2024-03-19 PROBLEM — Z90.12 S/P LEFT MASTECTOMY: Status: ACTIVE | Noted: 2017-10-17

## 2024-03-19 PROBLEM — Z95.1 S/P CABG X 3: Status: ACTIVE | Noted: 2019-06-20

## 2024-03-19 PROBLEM — C50.912 BREAST CANCER METASTASIZED TO AXILLARY LYMPH NODE, LEFT (H): Status: ACTIVE | Noted: 2017-11-03

## 2024-03-19 PROBLEM — H25.13 NUCLEAR SCLEROTIC CATARACT OF BOTH EYES: Status: ACTIVE | Noted: 2020-09-26

## 2024-03-19 PROBLEM — I48.0 PAF (PAROXYSMAL ATRIAL FIBRILLATION) (H): Status: ACTIVE | Noted: 2019-06-14

## 2024-03-19 PROBLEM — I25.10 CORONARY ARTERY DISEASE INVOLVING NATIVE CORONARY ARTERY OF NATIVE HEART WITHOUT ANGINA PECTORIS: Status: ACTIVE | Noted: 2019-06-11

## 2024-03-19 PROBLEM — Z79.01 LONG TERM (CURRENT) USE OF ANTICOAGULANTS: Status: ACTIVE | Noted: 2019-06-20

## 2024-03-19 PROCEDURE — 99205 OFFICE O/P NEW HI 60 MIN: CPT | Mod: 95

## 2024-03-19 ASSESSMENT — PAIN SCALES - GENERAL: PAINLEVEL: MODERATE PAIN (4)

## 2024-03-19 NOTE — TELEPHONE ENCOUNTER
PA Initiation    Medication: ZEPBOUND 2.5 MG/0.5ML SC SOAJ  Insurance Company: [employer] - Phone [phone] Fax [phone]  Pharmacy Filling the Rx: [facility]Y #[id number] - [place], [place] - [address] AT [address] [facility]  Filling Pharmacy Phone: [phone]  Filling Pharmacy Fax: [phone]  Start Date: 3/19/2024

## 2024-03-19 NOTE — PROGRESS NOTES
"Virtual Visit Details    Type of service:  Video Visit   Video Start Time: 12:00PM  Video End Time:1:04PM    Originating Location (pt. Location): Home    Distant Location (provider location):  Off-site  Platform used for Video Visit: AmWell      70 minutes spent by me on the date of the encounter doing chart review, history and exam, documentation and further activities per the note    New Bariatric Surgery Consultation Note    2024    RE: Beryl Mccoy  MR#: [id number]  : 1950      Referring provider:       3/19/2024    10:25 AM   --   Who referred you Radha Davidson       Chief Complaint/Reason for visit: evaluation for possible weight loss surgery    Dear Vinicius Sales MD (General),    I had the pleasure of seeing your patient, Beryl Mccoy, to evaluate her obesity and consider her for possible weight loss surgery. As you know, Beryl Mccoy is 73 year old.  She has a height of 5' 3\", a weight of 240 lbs 0 oz, and calculated Body mass index is 42.51 kg/m .    Assessment & Plan   Problem List Items Addressed This Visit       Class 3 severe obesity with serious comorbidity and body mass index (BMI) of 40.0 to 44.9 in adult, unspecified obesity type (H) - Primary     Overweight onset in childhood. Weight gain started in 20s through smoking cessation. Since then weight gain has been gradual. Lost around 20lbs through breast cancer treatment, but since has regained weight. Previously tried weight watchers, ashlyn HARRIS and increase exercise with minimal weight loss. Weight is currently influenced by hernaited disc that limits mobility. Currently is weight stable. Needs to lose weight for back surgery - Goal BMI<35 or 200lbs.     Comorbidities include QI, HTN, HLD, pre-diabetes, afib, CAD, breast cancer, ADHD, and Bipolar I. History of MI and CABG.     Discussed AOMs to help with weight loss:   - Phentermine contraindicated due to age and hx of HTN, Afib, CAD, MI and CABG.   - Topiramate " could be considered at a low dose if approved by psychiatrist. No hx of kidney stones of disease. GFR >60.   - Naltrexone could be considered in the future. No hx of liver disease. Not taking opioids.   - GLP-1 to be started today. No contraindications. Discussed side effects, risks, and benefits. No hx of pancreatitis. No personal or family hx of MTC or MENII. Concern for cost with medicare insurance. History of prediabetes. Discussed Zepbound coupon or compounded semiglutide.          Relevant Medications    tirzepatide-Weight Management (ZEPBOUND) 2.5 MG/0.5ML prefilled pen    tirzepatide-Weight Management (ZEPBOUND) 5 MG/0.5ML prefilled pen    Other Relevant Orders    Hemoglobin A1c    CBC with platelets    Comprehensive metabolic panel    Lipid panel reflex to direct LDL Fasting    Parathyroid Hormone Intact    Vitamin A    Vitamin B12    Vitamin D Deficiency    Med Therapy Management Referral     Other Visit Diagnoses       Pre-diabetes        Relevant Orders    Hemoglobin A1c           Start Zepbound 2.5mg once weekly for 4 weeks, then increase to 5.0mg once weekly. Consider Wegovy and Saxenda as needed. Consider compounded semaglutide   Bariatric labs ordered   Schedule psych eval   Clearances/letter of support:  - Primary care provider.   - cardiology   - sleep   - Psychiatrist  Schedule Group new. bariatric nutrition class  Dr. Meek in 1 month   [facility] pharmacist in 6 weeks for polypharmacy   Tanesha Santiago in 3 months       HISTORY OF PRESENT ILLNESS:      3/19/2024    10:25 AM   Weight Loss History Reviewed with Patient   How long have you been overweight? Since late 20's to early 40's   What is the most that you have ever weighed 270   What is the most weight you have lost? 30   I have tried the following methods to lose weight Watching portions or calories    Exercise    Weight Watchers    Pre packaged meals ex: Nutrisystem    Slimfast    OTC Medications   I have tried the following weight loss medications?  (Check all that apply) None     Overweight onset at 15yo. Weight gain has been gradual. Was smoking in 20s, and when stopped smoking saw around 10lb weight gain. Since then weight gain has been gradual. Has tried to lose weight through WW, medina HARRIS, and exercise - will lose minimal weight, and then hard to maintain. Was treated for breast cancer and lost to 216lbs, but has regained gradually since. Has also been influenced by back pain/herniated disc, and is nervous to work out. Has been weight stable for the past couple years. Needs to lose 40lb, goal of BMI <35/200lbs for back surgery. Wants to lose weigth as well to help with improve health and prevent comorbidities.     Eating 2 meals a day, but will graze in between. Increase hunger and thoughts of food. Craves sugar. Can get full with large portion sizes, and has a hard time staying full. Loves pizza. Has continued Medina HARRIS meals for all meals. Drinks water, coffee.     Activity - very limited due to herniated disc. Tries to walk, but can only go for 10min.     Has not tried AOMs in the past.       CO-MORBIDITIES OF OBESITY INCLUDE:      3/19/2024    10:25 AM   --   I have the following health issues associated with obesity Pre-Diabetes    Heart Disease    High Blood Pressure    Sleep Apnea    GERD (Reflux)    Lymphedema    Osteoarthritis (joint disease)     HTN - moderately controlled on medications. Followed by cardiology     HLD - controlled with medications. Followed by cariology     QI - on CPAP nightly. Followed by sleep     Afib - on blood thinners. hx of cardioversion. Followed by cardiology    MI, CAD - hx of stents and CABG. Followed by cardiology     GERD - symptoms of chest and abdominal pain. Was on PPI for a few years. No current symptoms.     Breat cancer - treated with chemo, radiation, and left mastectomy. Has been cancer free since 2018. Followed by oncology yearly    History of bleeding or clotting disorder? No    Is patient on biologics  or immunomodulators? No    PAST MEDICAL HISTORY:  Past Medical History:   Diagnosis Date    Anxiety     Atherosclerosis     Cancer (H)     Depressive disorder     Esophageal reflux     Gallbladder problem     History of radiation therapy     Hypertension     Migraines     Nonsenile cataract     Old myocardial infarction 06/07/2003    Other mental problems     Palpitations     Urinary incontinence        PAST SURGICAL HISTORY:  Past Surgical History:   Procedure Laterality Date    ABDOMEN SURGERY      BIOPSY      BREAST SURGERY      CARDIAC SURGERY      CHOLECYSTECTOMY      COSMETIC SURGERY      EYE SURGERY      GENITOURINARY SURGERY      GYN SURGERY      THORACIC SURGERY           FAMILY HISTORY:   No family history on file.    SOCIAL HISTORY:       3/19/2024    10:25 AM   Social History Questions Reviewed With Patient   Which best describes your employment status (select all that apply) I am retired   Which best describes your marital status    Who do you have in your support network that can be available to help you for the first 2 weeks after surgery? 2 daughters, friend   Who can you count on for support throughout your weight loss surgery journey? daughters, friends, family     Retired. Previous was a manager of a tech help desk. Has 2 daughters and 1 son. Good support system.     HABITS:      3/19/2024    10:25 AM   --   How often do you drink alcohol? Never   Do you currently use any of the following Nicotine products? No   Have you ever used any of the following nicotine products? Cigarettes   If you previously used any of these products, what year did you quit? 1977   Have you or are you currently using street drugs or prescription strength medication for which you do not have a prescription for? No   Do you have a history of chemical dependency (alcohol or drug abuse)? No     No cannabis     Currently taking narcotic/opioids No    PSYCHOLOGICAL HISTORY:       3/19/2024    10:25 AM   Psychological  "History Reviewed With Patient   Have you ever attempted suicide? Never.   Have you had thoughts of suicide in the past year? No   Have you ever been hospitalized for mental illness or a suicide attempt? Never.   Do you have a history of chronic pain? Yes   Are you currently being treated for any of the following? (select all that apply) Anxiety    Bipolar    ADHD   Are you currently seeing a therapist or counselor? No   Are you currently seeing a psychiatrist? Yes     Bipolar I and ADHD - Mood is stable on medications. Followed by psychiatrist     ROS:      3/19/2024    10:25 AM   --   Skin Skin fold rashes (groin or other folds)    Leg swelling    Varicose veins   HEENT Headaches    Teeth, dentures, or bridges needing repairs   Musculoskeletal Joint Pain    Back pain    Limited mobility    Swelling of legs    Arthritis   Cardiovascular Shortness of breath with activity    None of the above   Pulmonary Shortness of breath at rest    Shortness of breath with activity    Snoring    Awaken from sleep to catch your breath    People have told me I stop breathing while asleep    Experience morning headaches   Gastrointestinal Heartburn    Constipation   Genitourinary Stress incontinence (losing urine when coughing, sneezing, etc.)   Hematological None of the above   Neurological Migraine headaches   Female only Post-menopausal       EATING BEHAVIORS:      3/19/2024    10:25 AM   --   Have you or anyone else thought that you had an eating disorder? Yes   If you answered yes to the previous eating disorder question, select the types that apply from this list Other   If you answered \"Other\" to the type of eating disorder question above, please describe what it is unsure   Do you currently binge eat (eat a large amount of food in a short time)? Yes   Are you an emotional eater? No   Do you get up to eat after falling asleep? No   Can you afford 3 meals a day? Yes   Can you afford 50-60 dollars a month for vitamins? Yes "       EXERCISE:      3/19/2024    10:25 AM   --   How often do you exercise? 1 to 2 times per week   What is the duration of your exercise (in minutes)? 10 Minutes   What types of exercise do you do? other   What keeps you from being more active? Pain    My ability to walk or move around is limited       MEDICATIONS:  Current Outpatient Medications   Medication Sig Dispense Refill    apixaban ANTICOAGULANT (ELIQUIS) 5 MG tablet Take 5 mg by mouth      atorvastatin (LIPITOR) 80 MG tablet Take 1 tablet by mouth daily      hydrALAZINE (APRESOLINE) 50 MG tablet Take 50 mg by mouth      lamoTRIgine (LAMICTAL) 100 MG tablet Take 1.5 tablets by mouth daily      letrozole (FEMARA) 2.5 MG tablet Take 2.5 mg by mouth daily      losartan (COZAAR) 100 MG tablet Take 100 mg by mouth daily      potassium chloride roger ER (KLOR-CON M20) 20 MEQ CR tablet Take 0.5 tablets by mouth daily      sertraline (ZOLOFT) 100 MG tablet Take 100 mg by mouth daily      sotalol (BETAPACE) 120 MG tablet Take 120 mg by mouth      spironolactone (ALDACTONE) 25 MG tablet Take 1 tablet by mouth daily      tirzepatide-Weight Management (ZEPBOUND) 2.5 MG/0.5ML prefilled pen Inject 0.5 mLs (2.5 mg) Subcutaneous every 7 days For 4 weeks 2 mL 0    tirzepatide-Weight Management (ZEPBOUND) 5 MG/0.5ML prefilled pen Inject 0.5 mLs (5 mg) Subcutaneous every 7 days After completing 4 weeks of 2.5mg dose 2 mL 2    torsemide (DEMADEX) 20 MG tablet Take 20 mg by mouth daily         ALLERGIES:  Allergies   Allergen Reactions    Amiodarone Nausea and Vomiting    Lisinopril Cough    Ciprofloxacin Anxiety     Computed FIB-4 Calculation unavailable. One or more values for this score either were not found within the given timeframe or did not fit some other criterion.    Fib-4 < 1.3: No further evaluation at this point, unless other concerns    - If the Fib-4 is >2.67  Fibroscan and elective liver clinic referral    - Intermediate Fib-4 scores: Get a Fibroscan, consider  "repeating this in 1-2 years.    Anti-obesity medication ROS:    HEENT  Hx of glaucoma: No    Cardiovascular  CAD:Yes  HTN:Yes    Gastrointestinal  GERD:Yes  Constipation:No  Liver Dz:No  H/O Pancreatitis:No    Psychiatric  Bipolar: Yes  Anxiety:Yes  Depression:No  History of alcohol/drug abuse: No  Hx of eating disorder:No    Endocrine  Personal or family hx of MTC or MEN2:No  Diabetes/prediabetes: Yes    Neurologic:  Hx of seizures: No  Hx of migraines: Yes  Memory Impairment: No      History of kidney stones: No  Kidney disease: No  Current birth control:  tubal ligation, post menopausal        Objective    Ht 1.6 m (5' 3\")   Wt 108.9 kg (240 lb)   BMI 42.51 kg/m           Vitals:  No vitals were obtained today due to virtual visit.    Physical Exam   GENERAL: alert and no distress  EYES: Eyes grossly normal to inspection.  No discharge or erythema, or obvious scleral/conjunctival abnormalities.  RESP: No audible wheeze, cough, or visible cyanosis.    SKIN: Visible skin clear. No significant rash, abnormal pigmentation or lesions.  NEURO: Cranial nerves grossly intact.  Mentation and speech appropriate for age.  PSYCH: Appropriate affect, tone, and pace of words        In summary, Beryl Mccoy has Class III obesity with a body mass index of Body mass index is 42.51 kg/m . kg/m2 and the comorbidities stated above. She completed an informational seminar and is a possible candidate for the laparoscopic gastric sleeve.  She will have to complete the following pre-requisites:    Received weight loss goal of 10 lb prior to surgery.  Achieve clearance from dietitian to see surgeon.  Have preoperative laboratory tests drawn.  Psychological Evaluation with MMPI and clearance for weight loss surgery.  Letter of clearance from the following PCP, sleep, cardiology, psychiatrist.    Today in the office we discussed gastric sleeve surgery. Preoperative, perioperative, and postoperative processes, management, and follow up " were addressed.  Risks and benefits were outlined including the risk of death, staple line leak (1-2%), PE, DVT, ulcer, worsening GERD, N/V, stricture, hernia, wound infection, weight regain, and vitamin deficiencies. I emphasized exercise and activity along with appropriate food choice as the main foundation for weight loss with surgery providing surgical reinforcement of this.  All questions were answered.  A goal sheet and support group handout were given to the patient.      No  If you have not already watched our online seminar please go to [email or web address]    Weight loss requirement: 10lbs prior to surgery. Goal Weight: 230lbs. Will have final weight check 2-3 weeks prior to surgery at anesthesia or nurse pre-op teaching visit.    -Need current weight confirmation at primary clinic or weight management clinic No    Bariatric labs ordered, call for a lab only appointment at any [facility] lab. To find a lab location near you, please call [phone]. Please let us know if orders need to be faxed to a non [facility] lab.    Schedule bariatric psych eval as soon as possible.  List of psychologists will be sent to you via [employer] or given to you in clinic.     Call Dewayne Magdaleno at [phone] to discuss insurance coverage for bariatric surgery.  Please check with your insurance regarding bariatric surgery coverage also. Dewayne can also help you with scheduling psych eval if you are having difficulties.    The following clearance letters are needed: Letter templates will be sent to you via [employer] or given to you in clinic. Providers can submit through electronic medical record or fax to [phone].  - Primary care provider.   - cardiology   - sleep   - Psychiatrist    Smoking cessation and nicotine test needed: No    Birth control after surgery discussed. Patient instructed that 2 forms of birth control required after surgery and to avoid pregnancy for at least 18 months after  surgery: post menopausal.     NEXT VISITS: A  should reach out to you to schedule the following appointments.  If they do not reach you please call [phone] to schedule the following appointments:    -See dietitian in 1 month and monthly for 3 months    -See  [facility] pharmacist in 1 month to follow up on weight loss medications and polypharmacy prior to surgery     -See Tanesha Santiago in 3 months to follow up on pre-op weight loss and weight loss medications    -See Dr Meek in 1 month for bariatric surgeon visit. Discuss bariatric surgery.        Once the patient has completed the requirements in their task list and there are no further recommendations, the pt will be allowed to see the surgeon of their choice for consultation on the laparoscopic gastric sleeve surgery. Patient verbalizes understanding of the process to surgery and expectations for the postoperative period including the need for lifelong lifestyle changes, vitamin supplementation, and laboratory monitoring.    Medications that may contribute to the patient's obesity, such as antipsychotic medications, have been identified. Correctable endocrine disorders and other medical conditions have been ruled out, or are under successful treatment. Identified personal barriers to making and continuing needed life changes. Identified behavior changes/strategies to address personal barriers.    Sincerely,     Tanesha Rivas PA-C

## 2024-03-19 NOTE — Clinical Note
SELMA, Dr. Gaviota bourne. Task list started    insurance  Topical Sulfur Applications Pregnancy And Lactation Text: This medication is considered safe during pregnancy and breast feeding secondary to limited systemic absorption.

## 2024-03-19 NOTE — LETTER
"3/19/2024       RE: Beryl Mccoy  [address] [facility] [address]     Dear Colleague,    Thank you for referring your patient, Beryl Mccoy, to the [facility]. Please see a copy of my visit note below.    Virtual Visit Details    Type of service:  Video Visit   Video Start Time: 12:00PM  Video End Time:1:04PM    Originating Location (pt. Location): Home    Distant Location (provider location):  Off-site  Platform used for Video Visit: intelloCut      70 minutes spent by me on the date of the encounter doing chart review, history and exam, documentation and further activities per the note    New Bariatric Surgery Consultation Note    2024    RE: Beryl Mccoy  MR#: [id number]  : 1950      Referring provider:       3/19/2024    10:25 AM   --   Who referred you Radhadinesh Davidson       Chief Complaint/Reason for visit: evaluation for possible weight loss surgery    Dear Vinicius Sales MD (General),    I had the pleasure of seeing your patient, Beryl Mccoy, to evaluate her obesity and consider her for possible weight loss surgery. As you know, Beryl Mccoy is 73 year old.  She has a height of 5' 3\", a weight of 240 lbs 0 oz, and calculated Body mass index is 42.51 kg/m .    Assessment & Plan  Problem List Items Addressed This Visit       Class 3 severe obesity with serious comorbidity and body mass index (BMI) of 40.0 to 44.9 in adult, unspecified obesity type (H) - Primary     Overweight onset in childhood. Weight gain started in 20s through smoking cessation. Since then weight gain has been gradual. Lost around 20lbs through breast cancer treatment, but since has regained weight. Previously tried weight watchers, ashlyn HARRIS and increase exercise with minimal weight loss. Weight is currently influenced by hernaited disc that limits mobility. Currently is weight stable. Needs to " lose weight for back surgery - Goal BMI<35 or 200lbs.     Comorbidities include QI, HTN, HLD, pre-diabetes, afib, CAD, breast cancer, ADHD, and Bipolar I. History of MI and CABG.     Discussed AOMs to help with weight loss:   - Phentermine contraindicated due to age and hx of HTN, Afib, CAD, MI and CABG.   - Topiramate could be considered at a low dose if approved by psychiatrist. No hx of kidney stones of disease. GFR >60.   - Naltrexone could be considered in the future. No hx of liver disease. Not taking opioids.   - GLP-1 to be started today. No contraindications. Discussed side effects, risks, and benefits. No hx of pancreatitis. No personal or family hx of MTC or MENII. Concern for cost with medicare insurance. History of prediabetes. Discussed Zepbound coupon or compounded semiglutide.          Relevant Medications    tirzepatide-Weight Management (ZEPBOUND) 2.5 MG/0.5ML prefilled pen    tirzepatide-Weight Management (ZEPBOUND) 5 MG/0.5ML prefilled pen    Other Relevant Orders    Hemoglobin A1c    CBC with platelets    Comprehensive metabolic panel    Lipid panel reflex to direct LDL Fasting    Parathyroid Hormone Intact    Vitamin A    Vitamin B12    Vitamin D Deficiency    Med Therapy Management Referral     Other Visit Diagnoses       Pre-diabetes        Relevant Orders    Hemoglobin A1c           Start Zepbound 2.5mg once weekly for 4 weeks, then increase to 5.0mg once weekly. Consider Wegovy and Saxenda as needed. Consider compounded semaglutide   Bariatric labs ordered   Schedule psych eval   Clearances/letter of support:  - Primary care provider.   - cardiology   - sleep   - Psychiatrist  Schedule Group new. bariatric nutrition class  Dr. Meek in 1 month   MTM pharmacist in 6 weeks for polypharmacy   Tanesha Santiago in 3 months       HISTORY OF PRESENT ILLNESS:      3/19/2024    10:25 AM   Weight Loss History Reviewed with Patient   How long have you been overweight? Since late 20's to early 40's   What  is the most that you have ever weighed 270   What is the most weight you have lost? 30   I have tried the following methods to lose weight Watching portions or calories    Exercise    Weight Watchers    Pre packaged meals ex: Nutrisystem    Slimfast    OTC Medications   I have tried the following weight loss medications? (Check all that apply) None     Overweight onset at 13yo. Weight gain has been gradual. Was smoking in 20s, and when stopped smoking saw around 10lb weight gain. Since then weight gain has been gradual. Has tried to lose weight through WW, medina HARRIS, and exercise - will lose minimal weight, and then hard to maintain. Was treated for breast cancer and lost to 216lbs, but has regained gradually since. Has also been influenced by back pain/herniated disc, and is nervous to work out. Has been weight stable for the past couple years. Needs to lose 40lb, goal of BMI <35/200lbs for back surgery. Wants to lose weigth as well to help with improve health and prevent comorbidities.     Eating 2 meals a day, but will graze in between. Increase hunger and thoughts of food. Craves sugar. Can get full with large portion sizes, and has a hard time staying full. Loves pizza. Has continued Medina HARRIS meals for all meals. Drinks water, coffee.     Activity - very limited due to herniated disc. Tries to walk, but can only go for 10min.     Has not tried AOMs in the past.       CO-MORBIDITIES OF OBESITY INCLUDE:      3/19/2024    10:25 AM   --   I have the following health issues associated with obesity Pre-Diabetes    Heart Disease    High Blood Pressure    Sleep Apnea    GERD (Reflux)    Lymphedema    Osteoarthritis (joint disease)     HTN - moderately controlled on medications. Followed by cardiology     HLD - controlled with medications. Followed by cariology     QI - on CPAP nightly. Followed by sleep     Afib - on blood thinners. hx of cardioversion. Followed by cardiology    MI, CAD - hx of stents and CABG.  Followed by cardiology     GERD - symptoms of chest and abdominal pain. Was on PPI for a few years. No current symptoms.     Breat cancer - treated with chemo, radiation, and left mastectomy. Has been cancer free since 2018. Followed by oncology yearly    History of bleeding or clotting disorder? No    Is patient on biologics or immunomodulators? No    PAST MEDICAL HISTORY:  Past Medical History:   Diagnosis Date    Anxiety     Atherosclerosis     Cancer (H)     Depressive disorder     Esophageal reflux     Gallbladder problem     History of radiation therapy     Hypertension     Migraines     Nonsenile cataract     Old myocardial infarction 06/07/2003    Other mental problems     Palpitations     Urinary incontinence        PAST SURGICAL HISTORY:  Past Surgical History:   Procedure Laterality Date    ABDOMEN SURGERY      BIOPSY      BREAST SURGERY      CARDIAC SURGERY      CHOLECYSTECTOMY      COSMETIC SURGERY      EYE SURGERY      GENITOURINARY SURGERY      GYN SURGERY      THORACIC SURGERY           FAMILY HISTORY:   No family history on file.    SOCIAL HISTORY:       3/19/2024    10:25 AM   Social History Questions Reviewed With Patient   Which best describes your employment status (select all that apply) I am retired   Which best describes your marital status    Who do you have in your support network that can be available to help you for the first 2 weeks after surgery? 2 daughters, friend   Who can you count on for support throughout your weight loss surgery journey? daughters, friends, family     Retired. Previous was a manager of a tech help desk. Has 2 daughters and 1 son. Good support system.     HABITS:      3/19/2024    10:25 AM   --   How often do you drink alcohol? Never   Do you currently use any of the following Nicotine products? No   Have you ever used any of the following nicotine products? Cigarettes   If you previously used any of these products, what year did you quit? 1977   Have you  "or are you currently using street drugs or prescription strength medication for which you do not have a prescription for? No   Do you have a history of chemical dependency (alcohol or drug abuse)? No     No cannabis     Currently taking narcotic/opioids No    PSYCHOLOGICAL HISTORY:       3/19/2024    10:25 AM   Psychological History Reviewed With Patient   Have you ever attempted suicide? Never.   Have you had thoughts of suicide in the past year? No   Have you ever been hospitalized for mental illness or a suicide attempt? Never.   Do you have a history of chronic pain? Yes   Are you currently being treated for any of the following? (select all that apply) Anxiety    Bipolar    ADHD   Are you currently seeing a therapist or counselor? No   Are you currently seeing a psychiatrist? Yes     Bipolar I and ADHD - Mood is stable on medications. Followed by psychiatrist     ROS:      3/19/2024    10:25 AM   --   Skin Skin fold rashes (groin or other folds)    Leg swelling    Varicose veins   HEENT Headaches    Teeth, dentures, or bridges needing repairs   Musculoskeletal Joint Pain    Back pain    Limited mobility    Swelling of legs    Arthritis   Cardiovascular Shortness of breath with activity    None of the above   Pulmonary Shortness of breath at rest    Shortness of breath with activity    Snoring    Awaken from sleep to catch your breath    People have told me I stop breathing while asleep    Experience morning headaches   Gastrointestinal Heartburn    Constipation   Genitourinary Stress incontinence (losing urine when coughing, sneezing, etc.)   Hematological None of the above   Neurological Migraine headaches   Female only Post-menopausal       EATING BEHAVIORS:      3/19/2024    10:25 AM   --   Have you or anyone else thought that you had an eating disorder? Yes   If you answered yes to the previous eating disorder question, select the types that apply from this list Other   If you answered \"Other\" to the type " of eating disorder question above, please describe what it is unsure   Do you currently binge eat (eat a large amount of food in a short time)? Yes   Are you an emotional eater? No   Do you get up to eat after falling asleep? No   Can you afford 3 meals a day? Yes   Can you afford 50-60 dollars a month for vitamins? Yes       EXERCISE:      3/19/2024    10:25 AM   --   How often do you exercise? 1 to 2 times per week   What is the duration of your exercise (in minutes)? 10 Minutes   What types of exercise do you do? other   What keeps you from being more active? Pain    My ability to walk or move around is limited       MEDICATIONS:  Current Outpatient Medications   Medication Sig Dispense Refill    apixaban ANTICOAGULANT (ELIQUIS) 5 MG tablet Take 5 mg by mouth      atorvastatin (LIPITOR) 80 MG tablet Take 1 tablet by mouth daily      hydrALAZINE (APRESOLINE) 50 MG tablet Take 50 mg by mouth      lamoTRIgine (LAMICTAL) 100 MG tablet Take 1.5 tablets by mouth daily      letrozole (FEMARA) 2.5 MG tablet Take 2.5 mg by mouth daily      losartan (COZAAR) 100 MG tablet Take 100 mg by mouth daily      potassium chloride roger ER (KLOR-CON M20) 20 MEQ CR tablet Take 0.5 tablets by mouth daily      sertraline (ZOLOFT) 100 MG tablet Take 100 mg by mouth daily      sotalol (BETAPACE) 120 MG tablet Take 120 mg by mouth      spironolactone (ALDACTONE) 25 MG tablet Take 1 tablet by mouth daily      tirzepatide-Weight Management (ZEPBOUND) 2.5 MG/0.5ML prefilled pen Inject 0.5 mLs (2.5 mg) Subcutaneous every 7 days For 4 weeks 2 mL 0    tirzepatide-Weight Management (ZEPBOUND) 5 MG/0.5ML prefilled pen Inject 0.5 mLs (5 mg) Subcutaneous every 7 days After completing 4 weeks of 2.5mg dose 2 mL 2    torsemide (DEMADEX) 20 MG tablet Take 20 mg by mouth daily         ALLERGIES:  Allergies   Allergen Reactions    Amiodarone Nausea and Vomiting    Lisinopril Cough    Ciprofloxacin Anxiety     Computed FIB-4 Calculation unavailable. One  "or more values for this score either were not found within the given timeframe or did not fit some other criterion.    Fib-4 < 1.3: No further evaluation at this point, unless other concerns    - If the Fib-4 is >2.67  Fibroscan and elective liver clinic referral    - Intermediate Fib-4 scores: Get a Fibroscan, consider repeating this in 1-2 years.    Anti-obesity medication ROS:    HEENT  Hx of glaucoma: No    Cardiovascular  CAD:Yes  HTN:Yes    Gastrointestinal  GERD:Yes  Constipation:No  Liver Dz:No  H/O Pancreatitis:No    Psychiatric  Bipolar: Yes  Anxiety:Yes  Depression:No  History of alcohol/drug abuse: No  Hx of eating disorder:No    Endocrine  Personal or family hx of MTC or MEN2:No  Diabetes/prediabetes: Yes    Neurologic:  Hx of seizures: No  Hx of migraines: Yes  Memory Impairment: No      History of kidney stones: No  Kidney disease: No  Current birth control:  tubal ligation, post menopausal        Objective   Ht 1.6 m (5' 3\")   Wt 108.9 kg (240 lb)   BMI 42.51 kg/m           Vitals:  No vitals were obtained today due to virtual visit.    Physical Exam   GENERAL: alert and no distress  EYES: Eyes grossly normal to inspection.  No discharge or erythema, or obvious scleral/conjunctival abnormalities.  RESP: No audible wheeze, cough, or visible cyanosis.    SKIN: Visible skin clear. No significant rash, abnormal pigmentation or lesions.  NEURO: Cranial nerves grossly intact.  Mentation and speech appropriate for age.  PSYCH: Appropriate affect, tone, and pace of words        In summary, Beryl Mccoy has Class III obesity with a body mass index of Body mass index is 42.51 kg/m . kg/m2 and the comorbidities stated above. She completed an informational seminar and is a possible candidate for the laparoscopic gastric sleeve.  She will have to complete the following pre-requisites:    Received weight loss goal of 10 lb prior to surgery.  Achieve clearance from dietitian to see surgeon.  Have preoperative " laboratory tests drawn.  Psychological Evaluation with MMPI and clearance for weight loss surgery.  Letter of clearance from the following PCP, sleep, cardiology, psychiatrist.    Today in the office we discussed gastric sleeve surgery. Preoperative, perioperative, and postoperative processes, management, and follow up were addressed.  Risks and benefits were outlined including the risk of death, staple line leak (1-2%), PE, DVT, ulcer, worsening GERD, N/V, stricture, hernia, wound infection, weight regain, and vitamin deficiencies. I emphasized exercise and activity along with appropriate food choice as the main foundation for weight loss with surgery providing surgical reinforcement of this.  All questions were answered.  A goal sheet and support group handout were given to the patient.      No  If you have not already watched our online seminar please go to [email or web address]    Weight loss requirement: 10lbs prior to surgery. Goal Weight: 230lbs. Will have final weight check 2-3 weeks prior to surgery at anesthesia or nurse pre-op teaching visit.    -Need current weight confirmation at primary clinic or weight management clinic No    Bariatric labs ordered, call for a lab only appointment at any [facility] lab. To find a lab location near you, please call [phone]. Please let us know if orders need to be faxed to a non [facility] lab.    Schedule bariatric psych eval as soon as possible.  List of psychologists will be sent to you via [employer] (f. [employer]) or given to you in clinic.     Call Dewayne Magdaleno at [phone] to discuss insurance coverage for bariatric surgery.  Please check with your insurance regarding bariatric surgery coverage also. Dewayne can also help you with scheduling psych eval if you are having difficulties.    The following clearance letters are needed: Letter templates will be sent to you via Wander (f. [employer]) or given to you in clinic. Providers can submit through electronic medical  record or fax to [phone].  - Primary care provider.   - cardiology   - sleep   - Psychiatrist    Smoking cessation and nicotine test needed: No    Birth control after surgery discussed. Patient instructed that 2 forms of birth control required after surgery and to avoid pregnancy for at least 18 months after surgery: post menopausal.     NEXT VISITS: A  should reach out to you to schedule the following appointments.  If they do not reach you please call [phone] to schedule the following appointments:    -See dietitian in 1 month and monthly for 3 months    -See  MTM pharmacist in 1 month to follow up on weight loss medications and polypharmacy prior to surgery     -See Tanesha Santiago in 3 months to follow up on pre-op weight loss and weight loss medications    -See Dr Meek in 1 month for bariatric surgeon visit. Discuss bariatric surgery.        Once the patient has completed the requirements in their task list and there are no further recommendations, the pt will be allowed to see the surgeon of their choice for consultation on the laparoscopic gastric sleeve surgery. Patient verbalizes understanding of the process to surgery and expectations for the postoperative period including the need for lifelong lifestyle changes, vitamin supplementation, and laboratory monitoring.    Medications that may contribute to the patient's obesity, such as antipsychotic medications, have been identified. Correctable endocrine disorders and other medical conditions have been ruled out, or are under successful treatment. Identified personal barriers to making and continuing needed life changes. Identified behavior changes/strategies to address personal barriers.    Sincerely,     Tanesha Rivas PA-C

## 2024-03-19 NOTE — PROGRESS NOTES
"Virtual Visit Details    Type of service:  Video Visit   Video Start Time: {video visit start/end time for provider to select:767087}  Video End Time:{video visit start/end time for provider to select:145151}    Originating Location (pt. Location): {video visit patient location:258105::\"Home\"}  {PROVIDER LOCATION On-site should be selected for visits conducted from your clinic location or adjoining [facility] hospital, academic office, or other nearby [facility] building. Off-site should be selected for all other provider locations, including home:919471}  Distant Location (provider location):  {virtual location provider:726514}  Platform used for Video Visit: {Virtual Visit Platforms:154051::\"Content Raven\"}  "

## 2024-03-19 NOTE — NURSING NOTE
Is the patient currently in the state of [place]? YES    Visit mode:VIDEO    If the visit is dropped, the patient can be reconnected by: VIDEO VISIT: Text to cell phone:   Telephone Information:   Mobile [phone]       Will anyone else be joining the visit? NO  (If patient encounters technical issues they should call [phone] :[phone])    How would you like to obtain your AVS? MyChart    Are changes needed to the allergy or medication list? Pt stated no changes to allergies and Pt stated no med changes  Please remove any meds marked not taking and any flagged for removal.    Reason for visit: Consult    Wt/ht other than 24 hrs:    Pain more than one location:    Jessica RUFFINF

## 2024-03-21 NOTE — TELEPHONE ENCOUNTER
PRIOR AUTHORIZATION DENIED    Medication: ZEPBOUND 2.5 MG/0.5ML SC SOAJ  Insurance Company: [employer] - Phone [phone] Fax [phone]  Denial Date: 3/21/2024  Denial Reason(s):   Appeal Information:   Patient Notified: clinic to discuss with pt what they would like to do

## 2024-03-25 PROBLEM — E66.01 CLASS 3 SEVERE OBESITY WITH SERIOUS COMORBIDITY AND BODY MASS INDEX (BMI) OF 40.0 TO 44.9 IN ADULT, UNSPECIFIED OBESITY TYPE (H): Status: ACTIVE | Noted: 2024-03-25

## 2024-03-25 PROBLEM — E66.813 CLASS 3 SEVERE OBESITY WITH SERIOUS COMORBIDITY AND BODY MASS INDEX (BMI) OF 40.0 TO 44.9 IN ADULT, UNSPECIFIED OBESITY TYPE (H): Status: ACTIVE | Noted: 2024-03-25

## 2024-03-25 NOTE — ASSESSMENT & PLAN NOTE
Overweight onset in childhood. Weight gain started in 20s through smoking cessation. Since then weight gain has been gradual. Lost around 20lbs through breast cancer treatment, but since has regained weight. Previously tried weight watchers, ashlyn HARRIS and increase exercise with minimal weight loss. Weight is currently influenced by hernaited disc that limits mobility. Currently is weight stable. Needs to lose weight for back surgery - Goal BMI<35 or 200lbs.     Comorbidities include QI, HTN, HLD, pre-diabetes, afib, CAD, breast cancer, ADHD, and Bipolar I. History of MI and CABG.     Discussed AOMs to help with weight loss:   - Phentermine contraindicated due to age and hx of HTN, Afib, CAD, MI and CABG.   - Topiramate could be considered at a low dose if approved by psychiatrist. No hx of kidney stones of disease. GFR >60.   - Naltrexone could be considered in the future. No hx of liver disease. Not taking opioids.   - GLP-1 to be started today. No contraindications. Discussed side effects, risks, and benefits. No hx of pancreatitis. No personal or family hx of MTC or MENII. Concern for cost with medicare insurance. History of prediabetes. Discussed Zepbound coupon or compounded semiglutide.

## 2024-03-25 NOTE — PATIENT INSTRUCTIONS
"Dante Cordoba, it was nice to meet you today!  Thank you for allowing us the privilege of caring for you. We hope we provided you with the excellent service you deserve.   Please let us know if there is anything else we can do for you so that we can be sure you are completely satisfied with your care experience.    To ensure the quality of our services you may be receiving a patient satisfaction survey from an independent patient satisfaction monitoring company.    The greatest compliment you can give is a \"Likely to Recommend\"    Your visit was with Tanesha Rivas PA-C today.    Instructions per today's visit:     If you have not already watched our online seminar please go to [email or web address]    Start Zepbound 2.5mg once weekly for 4 weeks, then increase to 5.0mg once weekly.     Weight loss requirement: 10lbs prior to surgery. Goal Weight: 230lbs. Will have final weight check 2-3 weeks prior to surgery at anesthesia or nurse pre-op teaching visit.    -Need current weight confirmation at primary clinic or weight management clinic No    Bariatric labs ordered, call for a lab only appointment at any [facility] lab. To find a lab location near you, please call [phone]. Please let us know if orders need to be faxed to a non [facility] lab.    Schedule bariatric psych eval as soon as possible.  List of psychologists will be sent to you via Managed Objects or given to you in clinic.     Call Dewayne Magdaleno at [phone] to discuss insurance coverage for bariatric surgery.  Please check with your insurance regarding bariatric surgery coverage also. Dewayne can also help you with scheduling psych eval if you are having difficulties.    The following clearance letters are needed: Letter templates will be sent to you via [employer] Objects or given to you in clinic. Providers can submit through electronic medical record or fax to [phone].  - Primary care provider.   - cardiology   - sleep   - " "Psychiatrist    Smoking cessation and nicotine test needed: No    Birth control after surgery discussed. Patient instructed that 2 forms of birth control required after surgery and to avoid pregnancy for at least 18 months after surgery: post menopausal.     NEXT VISITS: A  should reach out to you to schedule the following appointments.  If they do not reach you please call [phone] to schedule the following appointments:    -See dietitian in 1 month and monthly for 3 months    -See  [facility] pharmacist in 1 month to follow up on weight loss medications and polypharmacy prior to surgery     -See Tanesha Santiago in 3 months to follow up on pre-op weight loss and weight loss medications    -See Dr Meek in 1 month for bariatric surgeon visit. Discuss bariatric surgery.      ___________________________________________________________________________  Important contact and scheduling information:  Please call our contact center at [phone] to schedule your next appointments.  For any nursing questions or concerns call Tara Adkins LPN at [phone] or Maria Eugenia Conway RN at [phone]  Please call during clinic hours Monday through Friday 8:00a - 4:00p if you have questions or you can contact us via Nearbox at anytime and we will reply during clinic hours.    Lab results will be communicated through My Chart or letter (if My Chart not used). Please call the clinic if you have not received communication after 1 week or if you have any questions.?  Clinic Fax: [phone]  __________________________________________________________________________    If labs were ordered today:    Please make an appointment to have them drawn at your convenience.     To schedule the Lab Appointment using Nearbox:  Select \"Schedule an Appointment\"  Select \"Lab Only\"  For \"A couple of questions\", select \"Other\"  For \"Which locations work for you?, select the location and set up the appointment    To schedule by phone call " [phone] to schedule a lab only appointment at any [facility] lab.  ___________________________________________________________________________  Work with A Health !  Virtual Sessions are Available through [facility] Clinics    To learn more, call to schedule a free, Health  Q&A appointment: [phone]     What is Health Coaching?  Do you know what you are supposed to do, but you just aren't doing it?  Then, HEALTH COACHING may help you!   Get unstuck and move forward with the support of a professionally trained NBC-HWC (National Board-Certified Health and ) who uses evidence-based approaches to help you move forward with healthy lifestyle changes in the areas of weight loss, stress management and overall well-being.    Health Coaches help you identify goals that will work best for you. Health Coaches provide support and encouragement with overcoming barriers and help you to find inspiration and motivation to lead a healthy lifestyle.    Option one:  Health Coaching 3-Pack; Three, 30-minute Health Coaching Visits, for $99  Visits are done virtually (phone or video)  This is a self pay service; we do not accept insurance for stanley coaching.    Option two:   The 24 week Plan; 11 Health Coaching Visits, and a 7 months subscription to [employer]-- on-demand fitness, nutrition and mindfulness classes, for $499 (employee discounts may be available). Participants will also meet regularly with a weight management Medical Provider and a Registered/Licensed Dietician.  This is a self-pay service; we do not accept insurance for health coaching.    To Schedule a free Health  Q&A appointment to learn more,  call [phone].  ____________________________________________________________________  [facility]  Healthy Lifestyle Group    Healthy Lifestyle Group  This is a 60 minute virtual coaching group for those who want  "to lead a healthier lifestyle. Come together to set goals and overcome barriers in a supportive group environment. We will address the four pillars of health--nutrition, exercise, sleep and emotional well-being.  This group is highly recommended for those who are participating in the 24 week Healthy Lifestyle Plan and our Health Coaching sessions.    WHEN: This group meets the first Friday of the month, 12:30 PM - 1:30 PM online, via a zoom meeting.      FACILITATOR: Led by National Board Certified Health and , Matilde Poe [facility].    TO REGISTER: Please call the Call Center at [phone] to register. You will get an appointment to attend in [facility]. Fifteen minutes prior to the meeting, complete the e-check in and you will get the link to join the meeting.  There is no charge to attend this group and space is limited.      2023 and 2024 Meeting Topics and Dates:    November 3: Introduction to Mindfulness (Learn simple and effective mindfulness practices and how it can benefit you)    December 8: Let's Talk (guided discussion on our wins and challenges)    January 5: New Years Vision: Manifest your Best 2024! (Guided imagery,  journaling and discussion)    February 2: Let's Talk    March 1: 10 Percent Happier by Jan Gomez (Book Bites; a guided discussion on the nuggets of wisdom from favorite wellness books; no need to read the book but highly encouraged)    April 5: Let's Talk    May 3: \"Essentialism; The Disciplined Pursuit of Less by Sha Reyna (book bites discussion)    June 7: Let's Talk    July 5: NO MEETING, off for the 4th of July Holiday    August 2: The Blue Zones, Secrets for Living a Longer Life by Jan Jackson (book bites discussion)      If you would like bariatric surgery specific support group info please let your care team know.         Thank you,   [facility] Comprehensive Weight Management Team        Zepbound (Tirzepatide)    Zepbound (Tirzepatide): is an injectable " prescription medicine recently FDA approved for adults with obesity or overweight with an additional condition affected by their weight (type 2 diabetes, high blood pressure, high cholesterol, obstructive sleep apnea, etc). Zepbound contains the same active ingredient as what is in Mounjaro, an injectable FDA approved for Type 2 Diabtes. Zepbound is intended to be used along with dietary/behavioral changes and consistent exercise to improve assist with weight loss and other health improvement outcomes.     It is given as a shot once a week. It activates the body's receptors for GIP (glucose-dependent insulinotropic polypeptide) and GLP-1 (glucagon-like peptide-1) receptor, two naturally occurring hormones that help tell the brain that you are full. It also works is by slowing down how quickly food leaves your stomach. What this means for you: You will start to feel gillis more quickly, notice portion changes and eat less often between meals. It is still important to maintain consistent eating schedule with meals +/- snacks and get in good hydration.      Dosing:  Initial dose: 2.5 mg injected subcutaneously once weekly for 4 weeks  Further dose changes can include: increase to 5 mg once weekly for 4 weeks, then 7.5 mg once weekly for 4 weeks, then 10 mg once weekly for 4 weeks then 12.5 mg once weekly for 4 weeks, then 15 mg (maximum dose) once weekly.    Dose changes are based on how you are tolerating the current dose, what benefits you are seeing at the current dose and if improvement in effectiveness of the drug is needed. The goal is to find the dose that works best for you with the least amount of side effects. You may find you reach this at a lower dose than the maximum dose.     Side effects: Patients are advised to eat more slowly and purposefully. Give yourself less portions. You may find after starting this medication you have a new point of fullness. It is also important to stay adequately hydrated on  the medication to reduce risks of some of these side effects. Many of these side effects (nausea, diarrhea, etc) occurred during dose escalation but did improve over time with continuing the medication. If side effects are unmanageable, reach out to your prescribing provider.     The risk of pancreatitis (inflammation of the pancreas) has been associated with this type of medication, but is very rare.  If you have had pancreatitis in the past, this medication may not be for you. Please let us know about any past history of pancreas problems. If you experience persistent severe abdominal pain (sometimes radiating to the back potentially accompanied by vomiting and have a fever), stop the medication and contact your provider immediately for assessment. They will do a blood test to check for pancreatitis.       How to Inject:   [email or web address]-[email or web address]-use    Storage:   Store your pen in the refrigerator. You may store your pen at room temperature for up to 21 days. If you store the pen at room temperature, do not return the pen to the refrigerator. Discard the pen if not used within 21 days after removing from the refrigerator.      For any questions or concerns please send a Jammcard message to our team or call our weight management call center at [phone] during regular business hours.

## 2024-03-26 ENCOUNTER — TELEPHONE (OUTPATIENT)
Dept: ENDOCRINOLOGY | Facility: CLINIC | Age: 74
End: 2024-03-26

## 2024-03-26 ENCOUNTER — VIRTUAL VISIT (OUTPATIENT)
Dept: ENDOCRINOLOGY | Facility: CLINIC | Age: 74
End: 2024-03-26
Payer: COMMERCIAL

## 2024-03-26 VITALS — BODY MASS INDEX: 44.3 KG/M2 | WEIGHT: 250 LBS | HEIGHT: 63 IN

## 2024-03-26 DIAGNOSIS — I48.0 PAF (PAROXYSMAL ATRIAL FIBRILLATION) (H): ICD-10-CM

## 2024-03-26 DIAGNOSIS — E78.2 MIXED HYPERLIPIDEMIA: Chronic | ICD-10-CM

## 2024-03-26 DIAGNOSIS — I25.10 CORONARY ARTERY DISEASE INVOLVING NATIVE CORONARY ARTERY OF NATIVE HEART WITHOUT ANGINA PECTORIS: ICD-10-CM

## 2024-03-26 DIAGNOSIS — I10 ESSENTIAL HYPERTENSION: Chronic | ICD-10-CM

## 2024-03-26 DIAGNOSIS — E66.813 CLASS 3 SEVERE OBESITY WITH SERIOUS COMORBIDITY AND BODY MASS INDEX (BMI) OF 40.0 TO 44.9 IN ADULT, UNSPECIFIED OBESITY TYPE (H): Primary | ICD-10-CM

## 2024-03-26 DIAGNOSIS — E66.01 CLASS 3 SEVERE OBESITY WITH SERIOUS COMORBIDITY AND BODY MASS INDEX (BMI) OF 40.0 TO 44.9 IN ADULT, UNSPECIFIED OBESITY TYPE (H): Primary | ICD-10-CM

## 2024-03-26 DIAGNOSIS — I25.9 CHRONIC ISCHEMIC HEART DISEASE: Chronic | ICD-10-CM

## 2024-03-26 DIAGNOSIS — Z95.1 S/P CABG X 3: ICD-10-CM

## 2024-03-26 PROCEDURE — 99207 PR NO CHARGE NURSE ONLY: CPT | Mod: 95

## 2024-03-26 RX ORDER — SEMAGLUTIDE 0.5 MG/.5ML
0.5 INJECTION, SOLUTION SUBCUTANEOUS WEEKLY
Qty: 2 ML | Refills: 2 | Status: SHIPPED | OUTPATIENT
Start: 2024-04-25 | End: 2024-06-19

## 2024-03-26 RX ORDER — SEMAGLUTIDE 0.25 MG/.5ML
0.25 INJECTION, SOLUTION SUBCUTANEOUS WEEKLY
Qty: 2 ML | Refills: 0 | Status: SHIPPED | OUTPATIENT
Start: 2024-03-26 | End: 2024-06-19

## 2024-03-26 NOTE — PROGRESS NOTES
New Bariatric Patient Class    Beryl Mccoy attended the New Consult WLS class today.     Patient's current comorbidities include:  Patient Active Problem List   Diagnosis    Attention deficit hyperactivity disorder (ADHD), inattentive type, moderate    Bipolar 1 disorder (H)    Breast cancer metastasized to axillary lymph node, left (H)    Chronic ischemic heart disease    Coronary artery disease involving native coronary artery of native heart without angina pectoris    Essential hypertension    Hyperlipidemia    Long term (current) use of anticoagulants    Nuclear sclerotic cataract of both eyes    Obstructive sleep apnea on CPAP    Prediabetes    PAF (paroxysmal atrial fibrillation) (H)    S/P CABG x 3    S/P left mastectomy    Class 3 severe obesity with serious comorbidity and body mass index (BMI) of 40.0 to 44.9 in adult, unspecified obesity type (H)       Current Outpatient Medications   Medication Sig Dispense Refill    apixaban ANTICOAGULANT (ELIQUIS) 5 MG tablet Take 5 mg by mouth      atorvastatin (LIPITOR) 80 MG tablet Take 1 tablet by mouth daily      hydrALAZINE (APRESOLINE) 50 MG tablet Take 50 mg by mouth      lamoTRIgine (LAMICTAL) 100 MG tablet Take 1.5 tablets by mouth daily      letrozole (FEMARA) 2.5 MG tablet Take 2.5 mg by mouth daily      losartan (COZAAR) 100 MG tablet Take 100 mg by mouth daily      potassium chloride roger ER (KLOR-CON M20) 20 MEQ CR tablet Take 0.5 tablets by mouth daily      sertraline (ZOLOFT) 100 MG tablet Take 100 mg by mouth daily      sotalol (BETAPACE) 120 MG tablet Take 120 mg by mouth      spironolactone (ALDACTONE) 25 MG tablet Take 1 tablet by mouth daily      tirzepatide-Weight Management (ZEPBOUND) 2.5 MG/0.5ML prefilled pen Inject 0.5 mLs (2.5 mg) Subcutaneous every 7 days For 4 weeks 2 mL 0    tirzepatide-Weight Management (ZEPBOUND) 5 MG/0.5ML prefilled pen Inject 0.5 mLs (5 mg) Subcutaneous every 7 days After completing 4 weeks of 2.5mg dose 2 mL 2     torsemide (DEMADEX) 20 MG tablet Take 20 mg by mouth daily         The following items were reviewed and questions answered.  Goal weight  Labs  Psych clearance  Specialty Clearances  Task list  Preop diet and exercise changes  Postop diet requirements  Postop medication requirements  Postop exercise  Postop lifetime behavior and diet changes    Patient will continue to meet with LOPEZ, Dietician and Surgeon as required preoperatively.    All questions answered.  Patient instructed to contact the office for any questions and to notify office of any updates/clearances completed.       Bariatric Task List    Fax:  Please fax all paperwork to: [phone] -     Status:  Is patient a candidate for bariatric surgery?:  patient is a candidate for bariatric surgery -     Cleared to schedule surgeon consult?:  cleared to schedule surgeon consult -     Status:  surgery evaluation in process -     Surgeon: Dr. Meek -     Tentative surgery month/year: TBD -        Insurance: Insurance:  [employer]/Medicare Advantage -      Contact insurance to discuss coverage: Needed -       Cigna: PCP Recommendation and Medical Clearance:    -     HP Referral:    -      Advanced beneficiary notification (ABN) for Medicare patients for RD visits   and surgery:   -      Weight history:   -     Other:    -        Patient Info: Initial Weight:  240 -     Date of Initial Weight/Height:  2/29/2024 -     Goal Weight (lbs):  230 -     Required Weight Loss:  10lb -     Surgery Type:  sleeve gastrectomy -     Multidisciplinary Meeting:    -        Dietician Visits: Structured weight loss required by insurance?:    -     Dietician Visit 1:  Needed -     Dietician Visit 2:  Needed -     Dietician Visit 3:  Needed -     Dietician Visit 4:    -     Dietician Visit 5:    -     Dietician Visit 6:    -     Dietician Visit additional:  Needed - Monthly until surgery - AS   Clearance from dietician to see surgeon?:    -     Dietician Notes:    -         Psychological Evaluation: Psych eval:  Needed - sent [facility]   Therapist letter of support:    -     Psychiatrist letter of support:  Needed - sent [facility]   Establish care with therapist:    -     Complete eating disorder evaluation:    -     Letter of clearance from therapist/eating disorder program:    -     Other:    -        Lab Work: Complete Blood Count:  Needed - Orderd 3/19/24 - AS   Comprehensive Metabolic Panel:  Needed - Orderd 3/19/24 - AS   Vitamin D:  Needed - Orderd 3/19/24 - AS   PTH:  Needed - Orderd 3/19/24 - AS   Hgb A1c:  Needed - Orderd 3/19/24 - AS    Lipids: Needed - Orderd 3/19/24 - AS    TSH ([facility], [facility], [place] MA):   -       Ferritin:   -       Folate:   -       Testosterone, Total and Free:   -     Thiamine:   -     Vitamin A: Needed - Orderd 3/19/24 - AS   Vitamin B12: Needed - Orderd 3/19/24 - AS   Zinc:   -     C-peptide:   -     H. pylori:    -     MRSA (2 swabs, minimum 48 hours apart):   -     Nicotine Testing:    -     Recheck Vitamin D:   -     Other:    -        Consults/ Clearance Sleep Medicine:  Needed - sent [facility]   Cardiac:  Needed - sent [facility]   Pain:   -     Dental:    -     Endocrine:    -     Gastroenterology:    -     Vascular Medicine:    -     Hematology:    -     Medical Weight Management:   -     Physical Therapy/Exercise:    -     Nephrology:    -     Neurology:    -     Pulmonology:    -     Rheumatology:    -     Other:    -     Other:    -     Other:    -        Testing: UGI:    -     EGD:    -     Sleep Study:   -     Other:   -     Other:    -        PCP: Establish care with PCP:  Completed -     Follow up with PCP:    -     PCP letter of support:  Needed - sent [facility]      Health Maintenance: Colonoscopy(> 50 yrs or family hx):    -     Mammogram (> 40 yrs or family hx):    -     Pap Smear (women):   -     Other:   -     Other:    -        Stopping Smoking/ Alcohol Use/Cannabis Use: Quit tobacco use (3 months smoke free)?:    -     Quit date:    -     Quit alcohol use:   -    "  Quit date:   -     Other:   -     Quit date:   -        Patient Education:  Information Session:  Needed -     Attended New Consult Class?: Completed - 3/26/24 - AS   Given \"Making your decision\" handout?:  Yes -     Given \"A Roadmap to you Weight Loss Surgery\" handout?: Yes -     Given \"Epic Care Companion\" information?: Yes -     Attended support group?:  Needed -     Support plan in place?:  Needed -     Research consents signed?:    -     Avoid NSAIDS/ Alternate Plan for Pain:   -        Additional Surgery Requirements: Review Coag plan:    -     HgA1c <8:    -     Inpatient pain consult:    -     Final nicotine screen:    -     Dental work complete:    -     Birth control plan:    -     Gallstone prevention plan (Actigall for 6 months postop):   -     Other:   -     Other:   -        Final Tasks:  Before surgery online preop class:  Needed -     After surgery online class:  Needed -     Nurse visit for information:  Needed -     Weight Check: Needed -     History and Physical: Pre-Assessment Clinic (PAC) -   Needed -     Final labs per clinic: Needed -     See [facility] Pharmacist for medication review and plan for after surgery (if DM, transplant hx, greater than 10 meds): Needed -     Chest xray per clinic:   -     Electrocardiogram (ECG) per clinic:   -     Schedule postop appointments: Needed -     Other:   -   -        Notes:   -              "

## 2024-03-26 NOTE — TELEPHONE ENCOUNTER
Patient agreed to the following appointments:    4/1/24 [facility] Nutrition Class  6/19/24 RET HUMPHREY (pre-surg) with Tanesha Rivas  5/23/24 Meet and Rufina with Dr Meek

## 2024-03-26 NOTE — TELEPHONE ENCOUNTER
Called patient to discuss HP's program A Call to Change. Patient states the best time to receive calls is Thursdays at 8 a.m. Referral submitted electronically today.

## 2024-03-26 NOTE — TELEPHONE ENCOUNTER
PA Initiation    Medication: WEGOVY 0.25 MG/0.5ML SC SOAJ  Insurance Company: [employer] - Phone [phone] Fax [phone]  Pharmacy Filling the Rx: [facility] #[id number] - [place], [place] - [address] AT [address] [facility]  Filling Pharmacy Phone: [phone]  Filling Pharmacy Fax: [phone]  Start Date: 3/26/2024

## 2024-03-26 NOTE — LETTER
"2024    To: [facility]    RE: Beryl Mccoy  [address] [address]  : 1950  MRN: [id number]    To Whom It May Concern,    I am writing on behalf of my patient, Beryl Mccoy to document the medical necessity of Wegovy for the treatment of:   Class 3 severe obesity with serious comorbidity and body mass index (BMI) of 40.0 to 44.9 in adult, unspecified obesity type (H) [E66.01, Z68.41]  - Primary      Chronic ischemic heart disease [I25.9]      Coronary artery disease involving native coronary artery of native heart without angina pectoris [I25.10]      Essential hypertension [I10]      Mixed hyperlipidemia [E78.2]      PAF (paroxysmal atrial fibrillation) (H) [I48.0]      S/P CABG x 3 [Z95.1]        This letter provides information about the patient's medical history and diagnosis and a statement summarizing my treatment rationale.     Summary of Patient History and Diagnosis  I had the pleasure of seeing your patient, Beryl Mccoy, to evaluate her obesity and consider her for possible weight loss surgery. As you know, Beryl Mccoy is 73 year old.  She has a height of 5' 3\", a weight of 240 lbs 0 oz, and calculated Body mass index is 42.51 kg/m .     Comorbidities include QI, HTN, HLD, pre-diabetes, afib, CAD, breast cancer, ADHD, and Bipolar I. History of MI and CABG.     HTN - moderately controlled on medications. Followed by cardiology      HLD - controlled with medications. Followed by cariology      QI - on CPAP nightly. Followed by sleep      Afib - on blood thinners. hx of cardioversion. Followed by cardiology     MI, CAD - hx of stents and CABG. Followed by cardiology      GERD - symptoms of chest and abdominal pain. Was on PPI for a few years. No current symptoms.      Breat cancer - treated with chemo, radiation, and left mastectomy. Has been cancer free since 2018. Followed by oncology yearly    Treatment Rationale  Denial Reason(s):        In patients " with pre-existing cardiovascular disease and overweight or obesity but without diabetes, weekly subcutaneous semaglutide at a dose of 2.4 mg was superior to placebo in reducing the incidence of death from cardiovascular causes, nonfatal myocardial infarction, or nonfatal stroke at a mean follow-up of 39.8 months.      She would benefit from a treatment option specifically geared towards improving satiety, which is a primary mechanism of action for glucagon-like peptide-1 receptor agonists. The studies on Wegovy (semaglutide 2.4 mg weekly) show highly promising results for the treatment of obesity, with mean weight loss approaching 16% after 68 weeks (Wadden, COOKIE, 2021).      Duration  Up to 12 months.      Summary  In summary, Wegovy is medically necessary for this patient s medical condition. Please call my office at [phone] if I can provide you with any additional information to approve my request. I look forward to receiving your timely response and approval of this request.     Sincerely,    Tanesha Rivas PA-C

## 2024-03-26 NOTE — PATIENT INSTRUCTIONS
Beryl Mccoy,    Thank you for taking time to attend the New Bariatric class.  Below are items to be mindful of as you work through the process as you wait for your surgery date.    In-Clinic Weight:  If you have not had an official in-clinic weight, please call [phone] to schedule a nurse visit for an in-clinic weight.  If you live a distance from the Clinic and Surgery Center, you can have the weight done with your PCP.  On the day of your weight, weigh at home in the same clothes you will weigh in at the clinic.  That way we can get a better correlation of the scales.    Labs: if you have not had your initial labs done yet, please schedule an appointment with a [facility] lab.  If you need to have your labs faxed to an outside lab, contact our office.    Clearances: work on getting scheduled for your Psych clearance and contact your other providers for other clearances/letters of support that are needed.  As you get your clearances, you are welcome to send a [employer] message and we will keep an eye out for the reports/letters.  Our fax is: [phone].  Electronic copies can always be uploaded into [employer] and sent as an attachment to a [employer] message.    Dietician visits: attend your dietician visits monthly.  Missed appointments can delay your surgery date.  All patients are required to attend monthly dietician visits until their surgery.    Nutrition/Intake Modification:  Decrease portion sizes.  Read labels and portion out food according to the portion on the container.    Take time to read the nutrition information for recipes.  You will be surprised where hidden carbohydrates can be.  Make sure you are getting in at least 60 grams of protein daily - roughly 20 grams per meal.  Decrease/eliminate carbs in the form of chips, crackers, pasta, rice, pancakes, waffles, bagels, and white potatoes.  Replace with healthy vegetables.  Increase water intake to at least 64 ounces each day.  Take at least 30  minutes to eat your meal.  Be mindful of your meal when eating.  Chew your food well.  Savor the flavors and textures and be mindful of your mood.  Switch to caffeine and carbonation free beverages.    Mental Health:  Take a look at your past and current relationship with food.  Work with a therapist/psychiatrist/counselor/psychologist to discuss your mental health as it relates to food and food intake.  Attend a support group.    Activity:  Get active!  It is important to be mobile after surgery.  It helps with weight loss, healthy muscles & bones, and decreases chances of blood clots.  Start slow and have small goals.  If you currently have mobility issues, start with chair-based exercises and work your way up to standing exercises.    8. Support Group Information  Please click the following link for Support Group Information and Times: [email or web address]    9.  Your Task List:    Bariatric Task List    Fax:  Please fax all paperwork to: [phone] -     Status:  Is patient a candidate for bariatric surgery?:  patient is a candidate for bariatric surgery -     Cleared to schedule surgeon consult?:  cleared to schedule surgeon consult -     Status:  surgery evaluation in process -     Surgeon: Dr. Meek -     Tentative surgery month/year: TBD -        Insurance: Insurance:  [employer] Advantage -      Contact insurance to discuss coverage: Needed -       Cign[employer]: PCP Recommendation and Medical Clearance:    -     HP Referral:    -      Advanced beneficiary notification (ABN) for Medicare patients for RD visits   and surgery:   -      Weight history:   -     Other:    -        Patient Info: Initial Weight:  240 -     Date of Initial Weight/Height:  2/29/2024 -     Goal Weight (lbs):  230 -     Required Weight Loss:  10lb -     Surgery Type:  sleeve gastrectomy -     Multidisciplinary Meeting:    -        Dietician Visits: Structured weight loss  "required by insurance?:    -     Dietician Visit 1:  Needed -     Dietician Visit 2:  Needed -     Dietician Visit 3:  Needed -     Dietician Visit additional:  Needed - Monthly until surgery - AS   Clearance from dietician to see surgeon?:    -     Dietician Notes:    -        Psychological Evaluation: Psych eval:  Needed - sent [facility]   Therapist letter of support:    -     Psychiatrist letter of support:  Needed - sent [facility]   Other:    -        Lab Work: Complete Blood Count:  Needed - Orderd 3/19/24 - AS   Comprehensive Metabolic Panel:  Needed - Orderd 3/19/24 - AS   Vitamin D:  Needed - Orderd 3/19/24 - AS   PTH:  Needed - Orderd 3/19/24 - AS   Hgb A1c:  Needed - Orderd 3/19/24 - AS    Lipids: Needed - Orderd 3/19/24 - AS   Vitamin A: Needed - Orderd 3/19/24 - AS   Vitamin B12: Needed - Orderd 3/19/24 - AS   Other:    -        Consults/ Clearance Sleep Medicine:  Needed - sent [facility]   Cardiac:  Needed - sent [facility]   Other:    -        PCP: Establish care with PCP:  Completed -     Follow up with PCP:    -     PCP letter of support:  Needed - sent [facility]      Patient Education:  Information Session:  Needed -     Attended New Consult Class?: Completed - 3/26/24 - AS   Given \"Making your decision\" handout?:  Yes -     Given \"A Roadmap to you Weight Loss Surgery\" handout?: Yes -     Given \"Epic Care Companion\" information?: Yes -     Attended support group?:  Needed -     Support plan in place?:  Needed -     Research consents signed?:    -     Avoid NSAIDS/ Alternate Plan for Pain:   -        Additional Surgery Requirements: Review Coag plan:    -     HgA1c <8:    -     Inpatient pain consult:    -     Final nicotine screen:    -     Dental work complete:    -     Birth control plan:    -     Gallstone prevention plan (Actigall for 6 months postop):   -     Other:   -     Other:   -        Final Tasks:  Before surgery online preop class:  Needed -     After surgery online class:  Needed -     Nurse visit for information:  " Needed -     Weight Check: Needed -     History and Physical: Pre-Assessment Clinic (PAC) -   Needed -     Final labs per clinic: Needed -     See MTM Pharmacist for medication review and plan for after surgery (if DM, transplant hx, greater than 10 meds): Needed -     Chest xray per clinic:   -     Electrocardiogram (ECG) per clinic:   -     Schedule postop appointments: Needed -     Other:   -   -        Notes:   -             Please feel free to reach out if you have any questions.    Sincerely,    Maria Eugenia Barbosa RN, BSN  RN Care Coordinator  General and Bariatric Surgery   Comprehensive Weight Management    Phone: [phone]  Fax: [phone]  [phone]

## 2024-03-28 NOTE — TELEPHONE ENCOUNTER
PRIOR AUTHORIZATION DENIED    Medication: WEGOVY 0.25 MG/0.5ML SC SOAJ  Insurance Company: [employer] - Phone [phone] Fax [phone]  Denial Date: 3/27/2024  Denial Reason(s):   Appeal Information:   Patient Notified: clinic to discuss with pt what they would like to do

## 2024-03-31 ENCOUNTER — HEALTH MAINTENANCE LETTER (OUTPATIENT)
Age: 74
End: 2024-03-31

## 2024-04-02 NOTE — TELEPHONE ENCOUNTER
Medication Appeal Request    Please initiate an appeal for the requested medication: WEGOVY 0.25 MG/0.5ML SC SOAJ    Has a letter of medical necessity been completed in EPIC?   yes    Any additional lab values/information to include?     Would you like to include any research articles?               If yes please include the hyperlink(s) below or fax to    [phone] for Specialty/Retail               [phone] for Infusion/Clinic Administered.                Include the patients name and MRN on the fax cover sheet.

## 2024-04-02 NOTE — TELEPHONE ENCOUNTER
Medication Appeal Initiation    Medication: WEGOVY 0.25 MG/0.5ML SC SOAJ  Appeal Start Date:  4/2/2024  Insurance Company: [employer]  Insurance Phone: [phone]  Insurance Fax: [phone]  Comments:

## 2024-04-16 NOTE — TELEPHONE ENCOUNTER
Appeal for Wegovy has been approved. I have sent the orders to the pharmacy and sent a [email or web address] message to the patient. Closing encounter

## 2024-04-16 NOTE — TELEPHONE ENCOUNTER
Prior Authorization Approval    Medication: WEGOVY 0.25 MG/0.5ML SC SOAJ  Authorization Effective Date:    Authorization Expiration Date:    Approved Dose/Quantity: 1 month  Reference #: [id number]   Insurance Company: [employer] - Phone [phone] Fax [phone]  Expected CoPay: $    CoPay Card Available:      Financial Assistance Needed: n/a  Which Pharmacy is filling the prescription: [facility]ACY [id number] [place] [id number] AT [address]  Pharmacy Notified: order sent to pharmacy  Patient Notified: my chart message sent to patient    CALLED INSURANCE [phone]  SPOKE TO WILBER FROST AND WAS TOLD APPEAL HAD BEEN APPROVED. THEY WILL RESEND THE APPROVAL LETTER

## 2024-05-15 ENCOUNTER — TELEPHONE (OUTPATIENT)
Dept: ENDOCRINOLOGY | Facility: CLINIC | Age: 74
End: 2024-05-15

## 2024-05-15 NOTE — TELEPHONE ENCOUNTER
MTM appointment no showed, we made one more attempt to reschedule.     Routing back to referring provider and MTM Pharmacist Team    [email or web address] Message Sent    Aruna Clemente CPhT  MTM

## 2024-05-29 ENCOUNTER — LAB (OUTPATIENT)
Dept: LAB | Facility: CLINIC | Age: 74
End: 2024-05-29
Payer: COMMERCIAL

## 2024-05-29 ENCOUNTER — TELEPHONE (OUTPATIENT)
Dept: ENDOCRINOLOGY | Facility: CLINIC | Age: 74
End: 2024-05-29

## 2024-05-29 ENCOUNTER — MYC MEDICAL ADVICE (OUTPATIENT)
Dept: ENDOCRINOLOGY | Facility: CLINIC | Age: 74
End: 2024-05-29

## 2024-05-29 DIAGNOSIS — E66.813 CLASS 3 SEVERE OBESITY WITH SERIOUS COMORBIDITY AND BODY MASS INDEX (BMI) OF 40.0 TO 44.9 IN ADULT, UNSPECIFIED OBESITY TYPE (H): ICD-10-CM

## 2024-05-29 DIAGNOSIS — R73.03 PRE-DIABETES: ICD-10-CM

## 2024-05-29 DIAGNOSIS — E66.01 CLASS 3 SEVERE OBESITY WITH SERIOUS COMORBIDITY AND BODY MASS INDEX (BMI) OF 40.0 TO 44.9 IN ADULT, UNSPECIFIED OBESITY TYPE (H): ICD-10-CM

## 2024-05-29 LAB
ERYTHROCYTE [DISTWIDTH] IN BLOOD BY AUTOMATED COUNT: 13.5 % (ref 10–15)
HBA1C MFR BLD: 6 % (ref 0–5.6)
HCT VFR BLD AUTO: 40.5 % (ref 35–47)
HGB BLD-MCNC: 13.2 G/DL (ref 11.7–15.7)
MCH RBC QN AUTO: 30.6 PG (ref 26.5–33)
MCHC RBC AUTO-ENTMCNC: 32.6 G/DL (ref 31.5–36.5)
MCV RBC AUTO: 94 FL (ref 78–100)
PLATELET # BLD AUTO: 164 10E3/UL (ref 150–450)
RBC # BLD AUTO: 4.31 10E6/UL (ref 3.8–5.2)
WBC # BLD AUTO: 6.1 10E3/UL (ref 4–11)

## 2024-05-29 PROCEDURE — 85027 COMPLETE CBC AUTOMATED: CPT

## 2024-05-29 PROCEDURE — 99000 SPECIMEN HANDLING OFFICE-LAB: CPT

## 2024-05-29 PROCEDURE — 82306 VITAMIN D 25 HYDROXY: CPT

## 2024-05-29 PROCEDURE — 80053 COMPREHEN METABOLIC PANEL: CPT

## 2024-05-29 PROCEDURE — 36415 COLL VENOUS BLD VENIPUNCTURE: CPT

## 2024-05-29 PROCEDURE — 80061 LIPID PANEL: CPT

## 2024-05-29 PROCEDURE — 84590 ASSAY OF VITAMIN A: CPT | Mod: 90

## 2024-05-29 PROCEDURE — 83036 HEMOGLOBIN GLYCOSYLATED A1C: CPT

## 2024-05-29 PROCEDURE — 83970 ASSAY OF PARATHORMONE: CPT

## 2024-05-29 PROCEDURE — 82607 VITAMIN B-12: CPT

## 2024-05-29 NOTE — TELEPHONE ENCOUNTER
General Call    Contacts         Type Contact Phone/Fax    05/29/2024 12:11 PM CDT Phone (Incoming) Beryl Mccoy (Self) [phone] (H)          Reason for Call:  wegovy    What are your questions or concerns:  pt would like a call back regarding wegovy     Could we send this information to you in [facility] or would you prefer to receive a phone call?:   Patient would prefer a phone call   Okay to leave a detailed message?: Yes at Cell number on file:    Telephone Information:   Mobile [phone]

## 2024-05-29 NOTE — TELEPHONE ENCOUNTER
Cost for Wegovy is $600 after insurance coverage. Patient would like to try compound semaglutide.   Will inform genesis Rivas of above.

## 2024-05-30 LAB
ALBUMIN SERPL BCG-MCNC: 4.5 G/DL (ref 3.5–5.2)
ALP SERPL-CCNC: 80 U/L (ref 40–150)
ALT SERPL W P-5'-P-CCNC: 27 U/L (ref 0–50)
ANION GAP SERPL CALCULATED.3IONS-SCNC: 11 MMOL/L (ref 7–15)
AST SERPL W P-5'-P-CCNC: 24 U/L (ref 0–45)
BILIRUB SERPL-MCNC: 0.5 MG/DL
BUN SERPL-MCNC: 17.7 MG/DL (ref 8–23)
CALCIUM SERPL-MCNC: 9.7 MG/DL (ref 8.8–10.2)
CHLORIDE SERPL-SCNC: 103 MMOL/L (ref 98–107)
CHOLEST SERPL-MCNC: 90 MG/DL
CREAT SERPL-MCNC: 0.82 MG/DL (ref 0.51–0.95)
DEPRECATED HCO3 PLAS-SCNC: 25 MMOL/L (ref 22–29)
EGFRCR SERPLBLD CKD-EPI 2021: 76 ML/MIN/1.73M2
FASTING STATUS PATIENT QL REPORTED: ABNORMAL
FASTING STATUS PATIENT QL REPORTED: ABNORMAL
GLUCOSE SERPL-MCNC: 101 MG/DL (ref 70–99)
HDLC SERPL-MCNC: 38 MG/DL
LDLC SERPL CALC-MCNC: 34 MG/DL
NONHDLC SERPL-MCNC: 52 MG/DL
POTASSIUM SERPL-SCNC: 4.7 MMOL/L (ref 3.4–5.3)
PROT SERPL-MCNC: 7.2 G/DL (ref 6.4–8.3)
PTH-INTACT SERPL-MCNC: 26 PG/ML (ref 15–65)
SODIUM SERPL-SCNC: 139 MMOL/L (ref 135–145)
TRIGL SERPL-MCNC: 90 MG/DL
VIT B12 SERPL-MCNC: 664 PG/ML (ref 232–1245)
VIT D+METAB SERPL-MCNC: 46 NG/ML (ref 20–50)

## 2024-05-31 LAB
ANNOTATION COMMENT IMP: NORMAL
RETINYL PALMITATE SERPL-MCNC: 0.02 MG/L
VIT A SERPL-MCNC: 0.69 MG/L

## 2024-06-04 DIAGNOSIS — E66.01 CLASS 3 SEVERE OBESITY WITH SERIOUS COMORBIDITY AND BODY MASS INDEX (BMI) OF 40.0 TO 44.9 IN ADULT, UNSPECIFIED OBESITY TYPE (H): Primary | ICD-10-CM

## 2024-06-04 DIAGNOSIS — E66.813 CLASS 3 SEVERE OBESITY WITH SERIOUS COMORBIDITY AND BODY MASS INDEX (BMI) OF 40.0 TO 44.9 IN ADULT, UNSPECIFIED OBESITY TYPE (H): Primary | ICD-10-CM

## 2024-06-19 ENCOUNTER — VIRTUAL VISIT (OUTPATIENT)
Dept: ENDOCRINOLOGY | Facility: CLINIC | Age: 74
End: 2024-06-19
Payer: COMMERCIAL

## 2024-06-19 VITALS — HEIGHT: 63 IN | WEIGHT: 235 LBS | BODY MASS INDEX: 41.64 KG/M2

## 2024-06-19 DIAGNOSIS — E66.01 CLASS 3 SEVERE OBESITY WITH SERIOUS COMORBIDITY AND BODY MASS INDEX (BMI) OF 40.0 TO 44.9 IN ADULT, UNSPECIFIED OBESITY TYPE (H): Primary | ICD-10-CM

## 2024-06-19 DIAGNOSIS — E66.813 CLASS 3 SEVERE OBESITY WITH SERIOUS COMORBIDITY AND BODY MASS INDEX (BMI) OF 40.0 TO 44.9 IN ADULT, UNSPECIFIED OBESITY TYPE (H): Primary | ICD-10-CM

## 2024-06-19 PROCEDURE — 99213 OFFICE O/P EST LOW 20 MIN: CPT | Mod: 95

## 2024-06-19 PROCEDURE — G2211 COMPLEX E/M VISIT ADD ON: HCPCS | Mod: 95

## 2024-06-19 NOTE — PROGRESS NOTES
Video-Visit Details    Type of service:  Video Visit   Video End Time: 7:14AM  Originating Location (pt. Location): Home    Distant Location (provider location):  Off-site  Platform used for Video Visit: Ismael    Pre-Bariatric Surgery Note    Vinicius Sales    Date: 2024     RE: Beryl Mccoy    MR#: [id number]   : 1950   Date of Visit: 2024    REASON FOR VISIT: Preoperative evaluation for possible weight loss surgery    Dear Vinicius Lindsey,    I had the pleasure of seeing your patient, Beryl Mccoy, in my preoperative bariatric clinic.    As you know, she has morbid obesity and is considering weight loss surgery to treat obesity in association with her medical conditions of obesity.     Assessment & Plan   Problem List Items Addressed This Visit       Class 3 severe obesity with serious comorbidity and body mass index (BMI) of 40.0 to 44.9 in adult, unspecified obesity type (H) - Primary      Start compounded semaglutide 0.25mg once weekly for 4 weeks, then increase to 0.5mg once weekly.  Prescription previously sent. Call pharmacy for next steps   Will continue with MWM at this time. She would like to see how starting AOM goes first   JOHN ramon in 6 weeks   Tanesha Santiago in 4-5 months       Reviewed tasklist with patient   PCP   Cardiology   Sleep   Psychiatrist   RD visits - has not completed   Dr. Meek - has not completed     Compounded semaglutide - has not yet started.     Wegovy was covered, but not price appropriate     Is not sure if she wants to go through bariatric surgery. Is also in the process of getting back surgery (BMI goal <35), mouth surgery, and breast reconstruction surgery. Would like to see how compounded semaglutide goes first     Has been going to PT. Has been walking more.     Eating 3 meals a day, with 3 snacks. 64oz of water daily. Has been very mindful of food choices.   Breakfast - oatmeal, eggs + mushrooms  Lunch - [employer] meal   Dinner - [employer] meal    Snacks - fruit and vegetables.     Most recent weights:  Wt Readings from Last 4 Encounters:   06/19/24 106.6 kg (235 lb)   03/26/24 113.4 kg (250 lb)   03/19/24 108.9 kg (240 lb)   03/13/24 108 kg (238 lb)         ROS    Past Medical History:   Diagnosis Date    Anxiety     Atherosclerosis     Cancer (H)     Depressive disorder     Esophageal reflux     Gallbladder problem     History of radiation therapy     Hypertension     Migraines     Nonsenile cataract     Old myocardial infarction 06/07/2003    Other mental problems     Palpitations     Urinary incontinence        Past Surgical History:   Procedure Laterality Date    ABDOMEN SURGERY      BIOPSY      BREAST SURGERY      CARDIAC SURGERY      CHOLECYSTECTOMY      COSMETIC SURGERY      EYE SURGERY      GENITOURINARY SURGERY      GYN SURGERY      IR CHEST PORT PLACEMENT > 5 YRS OF AGE  11/21/2017    IR LUMBAR PUNCTURE  1/4/2024    THORACIC SURGERY         Current Outpatient Medications   Medication Sig Dispense Refill    apixaban ANTICOAGULANT (ELIQUIS) 5 MG tablet Take 5 mg by mouth      atorvastatin (LIPITOR) 80 MG tablet Take 1 tablet by mouth daily      COMPOUNDED NON-CONTROLLED SUBSTANCE (CMPD RX) - PHARMACY TO MIX COMPOUNDED MEDICATION Compounded semaglutide 0.25mg subcutaneous injection once weekly for 4 weeks 4 each 0    [START ON 7/4/2024] COMPOUNDED NON-CONTROLLED SUBSTANCE (CMPD RX) - PHARMACY TO MIX COMPOUNDED MEDICATION Compounded semaglutide 0.5mg subcutaneous injection once weekly, after completing 4 weeks of 0.25mg dose 4 each 1    hydrALAZINE (APRESOLINE) 50 MG tablet Take 50 mg by mouth      lamoTRIgine (LAMICTAL) 100 MG tablet Take 1.5 tablets by mouth daily      letrozole (FEMARA) 2.5 MG tablet Take 2.5 mg by mouth daily      losartan (COZAAR) 100 MG tablet Take 100 mg by mouth daily      potassium chloride roger ER (KLOR-CON M20) 20 MEQ CR tablet Take 0.5 tablets by mouth daily      sertraline (ZOLOFT) 100 MG tablet Take 100 mg by mouth  "daily      sotalol (BETAPACE) 120 MG tablet Take 120 mg by mouth      spironolactone (ALDACTONE) 25 MG tablet Take 1 tablet by mouth daily      torsemide (DEMADEX) 20 MG tablet Take 20 mg by mouth daily       No current facility-administered medications for this visit.       Allergies   Allergen Reactions    Amiodarone Nausea and Vomiting    Lisinopril Cough    Ciprofloxacin Anxiety         Objective    Ht 1.6 m (5' 3\")   Wt 106.6 kg (235 lb)   BMI 41.63 kg/m    Vitals - Patient Reported  Pain Score: Severe Pain (6) (back pain)      Vitals:  No vitals were obtained today due to virtual visit.    Physical Exam   GENERAL: alert and no distress  EYES: Eyes grossly normal to inspection.  No discharge or erythema, or obvious scleral/conjunctival abnormalities.  RESP: No audible wheeze, cough, or visible cyanosis.    SKIN: Visible skin clear. No significant rash, abnormal pigmentation or lesions.  NEURO: Cranial nerves grossly intact.  Mentation and speech appropriate for age.  PSYCH: Appropriate affect, tone, and pace of words      Sincerely,    Tanesha Rivas PA-C      20 minutes spent by me on the date of the encounter doing chart review, history and exam, documentation and further activities per the note    The longitudinal plan of care for the diagnosis(es)/condition(s) as documented were addressed during this visit. Due to the added complexity in care, I will continue to support Beryl in the subsequent management and with ongoing continuity of care.    "

## 2024-06-19 NOTE — PATIENT INSTRUCTIONS
"Dante Cordoba,  Thank you for allowing us the privilege of caring for you. We hope we provided you with the excellent service you deserve.   Please let us know if there is anything else we can do for you so that we can be sure you are completely satisfied with your care experience.    To ensure the quality of our services you may be receiving a patient satisfaction survey from an independent patient satisfaction monitoring company.    The greatest compliment you can give is a \"Likely to Recommend\"    Your visit was with Tanesha Rivas PA-C today.    Instructions per today's visit:     Start compounded semaglutide 0.25mg once weekly for 4 weeks, then increase to 0.5mg once weekly.  Prescription previously sent. Call pharmacy for next steps   Will continue with MWM at this time.   MTM pharamcist in 6 weeks   Tanesha Santiago in 4-5 months     ___________________________________________________________________________  Important contact and scheduling information:  Please call our contact center at [phone] to schedule your next appointments.  For any nursing questions or concerns call Tara Akdins LPN at [phone] or Maria Eugenia Conway RN at [phone]  Please call during clinic hours Monday through Friday 8:00a - 4:00p if you have questions or you can contact us via [employer] at anytime and we will reply during clinic hours.    Lab results will be communicated through My Chart or letter (if My Chart not used). Please call the clinic if you have not received communication after 1 week or if you have any questions.?  Clinic Fax: [phone]  __________________________________________________________________________    If labs were ordered today:    Please make an appointment to have them drawn at your convenience.     To schedule the Lab Appointment using [employer]:  Select \"Schedule an Appointment\"  Select \"Lab Only\"  For \"A couple of questions\", select \"Other\"  For \"Which locations work for you?, select the location and set " up the appointment    To schedule by phone call [phone] to schedule a lab only appointment at any [facility] lab.  ___________________________________________________________________________  Work with A Health !  Virtual Sessions are Available through [facility]    To learn more, call to schedule a free, Health  Q&A appointment: [phone]     What is Health Coaching?  Do you know what you are supposed to do, but you just aren't doing it?  Then, HEALTH COACHING may help you!   Get unstuck and move forward with the support of a professionally trained NBC-HWC (National Board-Certified Health and ) who uses evidence-based approaches to help you move forward with healthy lifestyle changes in the areas of weight loss, stress management and overall well-being.    Health Coaches help you identify goals that will work best for you. Health Coaches provide support and encouragement with overcoming barriers and help you to find inspiration and motivation to lead a healthy lifestyle.    Option one:  Health Coaching 3-Pack; Three, 30-minute Health Coaching Visits, for $99  Visits are done virtually (phone or video)  This is a self pay service; we do not accept insurance for stanley coaching.    Option two:   The 24 week Plan; 11 Health Coaching Visits, and a 7 months subscription to [employer]-- on-demand fitness, nutrition and mindfulness classes, for $499 (employee discounts may be available). Participants will also meet regularly with a weight management Medical Provider and a Registered/Licensed Dietician.  This is a self-pay service; we do not accept insurance for health coaching.    To Schedule a free Health  Q&A appointment to learn more,  call [phone].  ____________________________________________________________________  [facility]  Healthy Lifestyle Group    Healthy Lifestyle Group  This is a 60  "minute virtual coaching group for those who want to lead a healthier lifestyle. Come together to set goals and overcome barriers in a supportive group environment. We will address the four pillars of health--nutrition, exercise, sleep and emotional well-being.  This group is highly recommended for those who are participating in the 24 week Healthy Lifestyle Plan and our Health Coaching sessions.    WHEN: This group meets the first Friday of the month, 12:30 PM - 1:30 PM online, via a zoom meeting.      FACILITATOR: Led by National Board Certified Health and , Matilde Poe, [facility].    TO REGISTER: Please call the Call Center at [phone] to register. You will get an appointment to attend in [employer]. Fifteen minutes prior to the meeting, complete the e-check in and you will get the link to join the meeting.  There is no charge to attend this group and space is limited.      2023 and 2024 Meeting Topics and Dates:    November 3: Introduction to Mindfulness (Learn simple and effective mindfulness practices and how it can benefit you)    December 8: Let's Talk (guided discussion on our wins and challenges)    January 5: New Years Vision: Manifest your Best 2024! (Guided imagery,  journaling and discussion)    February 2: Let's Talk    March 1: 10 Percent Happier by Jan Gomez (Book Bites; a guided discussion on the nuggets of wisdom from favorite wellness books; no need to read the book but highly encouraged)    April 5: Let's Talk    May 3: \"Essentialism; The Disciplined Pursuit of Less by Sha Reyna (book bites discussion)    June 7: Let's Talk    July 5: NO MEETING, off for the 4th of July Holiday    August 2: The Blue Zones, Secrets for Living a Longer Life by Jan Jackson (book bites discussion)      If you would like bariatric surgery specific support group info please let your care team know.         Thank you,   [facility] Comprehensive Weight Management " Team                              Compound Semaglutide at [facility]  [facility] is now offering compounded semaglutide during the time of Wegovy national shortage/limited supply. Semaglutide is the generic name of Wegovy. [facility] is following the highest standards for sterility and compounding practices. Not all compounding practices are equal. Therefore, [facility] will not be prescribing compounded semaglutide outside of the [facility]. Compounding of semaglutide is legal for as long as Wegovy is on the FDA's national shortage list. When/if Wegovy is taken off the FDA's shortage list, compounded semaglutide will no longer be legal to manufacture. When this occurs, patients will have to turn to acquiring Wegovy via its available manufactured pen, look into alternative weight loss medication(s), or stop the medication. Compound semaglutide will be available as a pre-filled syringe. Due to high demand of compounded semaglutide, orders may take 1-2 weeks to obtain from time of prescribing. Each dose of the medication will require a separate prescription.     As with any weight loss medication(s), there is a risk of weight regain should you stop semaglutide. It is important to be aware of this risk should you stop compounded semaglutide with no plans to transition back to an alternative injectable option as the use of semaglutide is intended for long term weight management with the intention of remaining on this injectable long term.        Obtaining Medication and Storage:   The pharmacy must speak to the patient directly prior to shipping medication to walk through administration, shipping and cost. Pre-filled syringes of compounded semaglutide and needles will be mailed from the compounding pharmacy to your home in a refrigerated box. The pre-filled syringes should be stored in the refrigerator until time of injection. The  medication is good for at least 30 days in refrigerator.     Dosing:  Week 1-4: Inject 0.25 mg subcutaneously once weekly  Week 5-8: If tolerating, increase to 0.5 mg once weekly  Week 9-12: If tolerating, increase to 1 mg once weekly  Week 13-15: If tolerating, increase to 1.5 mg once weekly  Week 16-19: If tolerating, increase to 2 mg once weekly  Week 20 & on: If tolerating, increase to 2.5 mg once weekly   *If you are having some nausea or other side effects to where you are hesitant to move up to the next dose, stay at the same dose you are on for an additional 4 weeks to see if side effect(s) improves/resolves. Make sure to take this time to hydrate and utilizing smaller more consistent meals, such as 4-6 small meals per day.  It may be advantageous to stay at the same dose if you are seeing good efficacy (both on and off the scale) and having minimal/manageable side effects. If you do not have additional refills on that dose's prescription, please reach out to the clinic.    Common Side Effects:  Side effect profile is the same as Wegovy. Monitor for nausea, diarrhea, constipation, headache, indigestion, tiredness (fatigue), stomach upset or abdominal pain. Less commonly, semaglutide can cause low blood sugar (symptoms: shaky, dizzy, sweaty, agitation). Please reach out to the care team should you feel like this is occurring. It is important to ensure that you are eating consistent meals and not skipping meals. Ensure you are getting at least 64 oz water daily. If any side effects become unmanageable, contact the care team immediately.    Administration:  Wash your hands.   Obtain compound semaglutide syringe, needle and alcohol swab. Remove pre-filled syringe from refrigerator. Keep all other unused syringes in the refrigerator until time of use.   Inspect the syringe to ensure medication in syringe is clear/colorless and the clear shell cap on top of red syringe cap is intact.  Unlock the cap by pulling the  "clear outer shell straight off and remove the red syringe cap by twisting counter clockwise.  Attach needle to syringe by twisting needle onto syringe clockwise. Do not remove needle cap.   Choose injection site. Clean injection site with alcohol swab.    Appropriate areas of injection are: abdomen (at least 2 inches away from belly button) or front middle thigh.   Remove needle cap from syringe/needle.   Hold the syringe like a pencil. Insert the needle into skin at a 90-degree angle.  Using your pointer finger, push the syringe plunger down to inject the medicine.  Count to six. Then, remove the syringe from your skin.   Immediately place the syringe in a sharps container.   You can purchase a sharps container from your local pharmacy or [employer]. If you don't have a sharps container, you can use a plastic detergent container with a lid. The container should seal tightly, hold objects without leaking, breaking or cracking, and clearly be labelled \"sharps\".     Compounded Semaglutide monthly (4 pre-filled syringes) cost:   0.25 mg ~$230   0.5 mg ~$260   1 mg ~$306   1.5 mg ~$342   2 mg ~$395   2.5 mg ~$438    [facility] Phone:  [phone]    "

## 2024-06-19 NOTE — LETTER
2024       RE: Beryl Mccoy  [address] [place] [address]     Dear Colleague,    Thank you for referring your patient, Beryl Mccoy, to the [facility] [place] [facility]. Please see a copy of my visit note below.      Video-Visit Details    Type of service:  Video Visit   Video End Time: 7:14AM  Originating Location (pt. Location): Home    Distant Location (provider location):  Off-site  Platform used for Video Visit: Ismael    Pre-Bariatric Surgery Note    Vinicius Sales    Date: 2024     RE: Beryl Mccoy    MR#: [id number]   : 1950   Date of Visit: 2024    REASON FOR VISIT: Preoperative evaluation for possible weight loss surgery    Dear Vinicius Lindsey,    I had the pleasure of seeing your patient, Beryl Mccoy, in my preoperative bariatric clinic.    As you know, she has morbid obesity and is considering weight loss surgery to treat obesity in association with her medical conditions of obesity.     Assessment & Plan  Problem List Items Addressed This Visit       Class 3 severe obesity with serious comorbidity and body mass index (BMI) of 40.0 to 44.9 in adult, unspecified obesity type (H) - Primary      Start compounded semaglutide 0.25mg once weekly for 4 weeks, then increase to 0.5mg once weekly.  Prescription previously sent. Call pharmacy for next steps   Will continue with MWM at this time. She would like to see how starting AOM goes first   MTM pharamcist in 6 weeks   Tanesha Santiago in 4-5 months       Reviewed tasklist with patient   PCP   Cardiology   Sleep   Psychiatrist   RD visits - has not completed   Dr. Meek - has not completed     Compounded semaglutide - has not yet started.     Wegovy was covered, but not price appropriate     Is not sure if she wants to go through bariatric surgery. Is also in the process of getting back surgery (BMI goal <35), mouth surgery, and breast  reconstruction surgery. Would like to see how compounded semaglutide goes first     Has been going to PT. Has been walking more.     Eating 3 meals a day, with 3 snacks. 64oz of water daily. Has been very mindful of food choices.   Breakfast - oatmeal, eggs + mushrooms  Lunch - [employer] meal   Dinner - [employer] meal   Snacks - fruit and vegetables.     Most recent weights:  Wt Readings from Last 4 Encounters:   06/19/24 106.6 kg (235 lb)   03/26/24 113.4 kg (250 lb)   03/19/24 108.9 kg (240 lb)   03/13/24 108 kg (238 lb)         ROS    Past Medical History:   Diagnosis Date    Anxiety     Atherosclerosis     Cancer (H)     Depressive disorder     Esophageal reflux     Gallbladder problem     History of radiation therapy     Hypertension     Migraines     Nonsenile cataract     Old myocardial infarction 06/07/2003    Other mental problems     Palpitations     Urinary incontinence        Past Surgical History:   Procedure Laterality Date    ABDOMEN SURGERY      BIOPSY      BREAST SURGERY      CARDIAC SURGERY      CHOLECYSTECTOMY      COSMETIC SURGERY      EYE SURGERY      GENITOURINARY SURGERY      GYN SURGERY      IR CHEST PORT PLACEMENT > 5 YRS OF AGE  11/21/2017    IR LUMBAR PUNCTURE  1/4/2024    THORACIC SURGERY         Current Outpatient Medications   Medication Sig Dispense Refill    apixaban ANTICOAGULANT (ELIQUIS) 5 MG tablet Take 5 mg by mouth      atorvastatin (LIPITOR) 80 MG tablet Take 1 tablet by mouth daily      COMPOUNDED NON-CONTROLLED SUBSTANCE (CMPD RX) - PHARMACY TO MIX COMPOUNDED MEDICATION Compounded semaglutide 0.25mg subcutaneous injection once weekly for 4 weeks 4 each 0    [START ON 7/4/2024] COMPOUNDED NON-CONTROLLED SUBSTANCE (CMPD RX) - PHARMACY TO MIX COMPOUNDED MEDICATION Compounded semaglutide 0.5mg subcutaneous injection once weekly, after completing 4 weeks of 0.25mg dose 4 each 1    hydrALAZINE (APRESOLINE) 50 MG tablet Take 50 mg by mouth      lamoTRIgine (LAMICTAL) 100 MG tablet  "Take 1.5 tablets by mouth daily      letrozole (FEMARA) 2.5 MG tablet Take 2.5 mg by mouth daily      losartan (COZAAR) 100 MG tablet Take 100 mg by mouth daily      potassium chloride roger ER (KLOR-CON M20) 20 MEQ CR tablet Take 0.5 tablets by mouth daily      sertraline (ZOLOFT) 100 MG tablet Take 100 mg by mouth daily      sotalol (BETAPACE) 120 MG tablet Take 120 mg by mouth      spironolactone (ALDACTONE) 25 MG tablet Take 1 tablet by mouth daily      torsemide (DEMADEX) 20 MG tablet Take 20 mg by mouth daily       No current facility-administered medications for this visit.       Allergies   Allergen Reactions    Amiodarone Nausea and Vomiting    Lisinopril Cough    Ciprofloxacin Anxiety         Objective   Ht 1.6 m (5' 3\")   Wt 106.6 kg (235 lb)   BMI 41.63 kg/m    Vitals - Patient Reported  Pain Score: Severe Pain (6) (back pain)      Vitals:  No vitals were obtained today due to virtual visit.    Physical Exam   GENERAL: alert and no distress  EYES: Eyes grossly normal to inspection.  No discharge or erythema, or obvious scleral/conjunctival abnormalities.  RESP: No audible wheeze, cough, or visible cyanosis.    SKIN: Visible skin clear. No significant rash, abnormal pigmentation or lesions.  NEURO: Cranial nerves grossly intact.  Mentation and speech appropriate for age.  PSYCH: Appropriate affect, tone, and pace of words      Sincerely,    Tanesha Rivas PA-C      20 minutes spent by me on the date of the encounter doing chart review, history and exam, documentation and further activities per the note    The longitudinal plan of care for the diagnosis(es)/condition(s) as documented were addressed during this visit. Due to the added complexity in care, I will continue to support Beryl in the subsequent management and with ongoing continuity of care.  "

## 2024-06-19 NOTE — NURSING NOTE
Is the patient currently in the state of [place]? YES    Visit mode:VIDEO    If the visit is dropped, the patient can be reconnected by: VIDEO VISIT: Text to cell phone:   Telephone Information:   Mobile [phone]       Will anyone else be joining the visit? NO  (If patient encounters technical issues they should call [phone] :[phone])    How would you like to obtain your AVS? MyChart    Are changes needed to the allergy or medication list? No, patient reported no changes to e-check in information for visit. VF did not review e-check in information again with patient due to this.     Are refills needed on medications prescribed by this physician? Needs medication    Reason for visit: RECHECK (Patient hasn't gotten medication yet.)    Patient did not provide current height and weight as she said she entered that information in e-check in. VF did not see answers on e-check in, so no values entered.    Henny OLVERA

## 2024-06-19 NOTE — PROGRESS NOTES
"Virtual Visit Details    Type of service:  Video Visit   Video Start Time: {video visit start/end time for provider to select:905686}  Video End Time:{video visit start/end time for provider to select:971826}    Originating Location (pt. Location): {video visit patient location:292981::\"Home\"}  {PROVIDER LOCATION On-site should be selected for visits conducted from your clinic location or adjoining [facility] hospital, academic office, or other nearby [facility] building. Off-site should be selected for all other provider locations, including home:802197}  Distant Location (provider location):  {virtual location provider:556396}  Platform used for Video Visit: {Virtual Visit Platforms:098891::\"[employer]\"}    "

## 2024-09-23 NOTE — TELEPHONE ENCOUNTER
RECORDS RECEIVED FROM: Internal    REASON FOR VISIT: Ataxia   PROVIDER: Henrik Issa DO   DATE OF APPT: 10/30/24 @ 9:30 am    NOTES (FOR ALL VISITS) STATUS DETAILS   OFFICE NOTE from referring provider Internal 2/29/24 Radha Davidson MD @[facility]-NeuroSurg     MEDICATION LIST Internal    IMAGING  (FOR ALL VISITS)     MRI (HEAD, NECK, SPINE) Internal [facility]  1/31/24 MR Thoracic Spine  1/26/24 MR Lumbar Spine     CT (HEAD, NECK, SPINE) Internal [facility]  2/6/23 CT Head    [facility]  5/31/22 CT Head

## 2024-10-30 ENCOUNTER — PRE VISIT (OUTPATIENT)
Dept: NEUROLOGY | Facility: CLINIC | Age: 74
End: 2024-10-30

## 2024-10-30 ENCOUNTER — LAB (OUTPATIENT)
Dept: LAB | Facility: CLINIC | Age: 74
End: 2024-10-30
Payer: COMMERCIAL

## 2024-10-30 ENCOUNTER — OFFICE VISIT (OUTPATIENT)
Dept: NEUROLOGY | Facility: CLINIC | Age: 74
End: 2024-10-30
Attending: NEUROLOGICAL SURGERY
Payer: COMMERCIAL

## 2024-10-30 VITALS
DIASTOLIC BLOOD PRESSURE: 76 MMHG | WEIGHT: 224.9 LBS | BODY MASS INDEX: 39.85 KG/M2 | HEIGHT: 63 IN | HEART RATE: 68 BPM | OXYGEN SATURATION: 96 % | SYSTOLIC BLOOD PRESSURE: 120 MMHG

## 2024-10-30 DIAGNOSIS — G62.9 NEUROPATHY: ICD-10-CM

## 2024-10-30 DIAGNOSIS — R79.9 ABNORMAL FINDING OF BLOOD CHEMISTRY, UNSPECIFIED: ICD-10-CM

## 2024-10-30 DIAGNOSIS — E55.9 VITAMIN D DEFICIENCY, UNSPECIFIED: ICD-10-CM

## 2024-10-30 DIAGNOSIS — R27.0 ATAXIA: ICD-10-CM

## 2024-10-30 DIAGNOSIS — R93.89 ABNORMAL FINDINGS ON DIAGNOSTIC IMAGING OF OTHER SPECIFIED BODY STRUCTURES: ICD-10-CM

## 2024-10-30 DIAGNOSIS — G62.9 NEUROPATHY: Primary | ICD-10-CM

## 2024-10-30 LAB
EST. AVERAGE GLUCOSE BLD GHB EST-MCNC: 128 MG/DL
HBA1C MFR BLD: 6.1 %
TOTAL PROTEIN SERUM FOR ELP: 7.2 G/DL (ref 6.4–8.3)
TSH SERPL DL<=0.005 MIU/L-ACNC: 2.29 UIU/ML (ref 0.3–4.2)
VIT B12 SERPL-MCNC: 760 PG/ML (ref 232–1245)
VIT D+METAB SERPL-MCNC: 53 NG/ML (ref 20–50)

## 2024-10-30 PROCEDURE — 36415 COLL VENOUS BLD VENIPUNCTURE: CPT | Performed by: PATHOLOGY

## 2024-10-30 PROCEDURE — 99204 OFFICE O/P NEW MOD 45 MIN: CPT | Performed by: PSYCHIATRY & NEUROLOGY

## 2024-10-30 PROCEDURE — 84165 PROTEIN E-PHORESIS SERUM: CPT | Mod: TC | Performed by: STUDENT IN AN ORGANIZED HEALTH CARE EDUCATION/TRAINING PROGRAM

## 2024-10-30 PROCEDURE — 82306 VITAMIN D 25 HYDROXY: CPT | Performed by: PSYCHIATRY & NEUROLOGY

## 2024-10-30 PROCEDURE — 83036 HEMOGLOBIN GLYCOSYLATED A1C: CPT | Performed by: PSYCHIATRY & NEUROLOGY

## 2024-10-30 PROCEDURE — 99000 SPECIMEN HANDLING OFFICE-LAB: CPT | Performed by: PATHOLOGY

## 2024-10-30 PROCEDURE — 84207 ASSAY OF VITAMIN B-6: CPT | Mod: 90 | Performed by: PATHOLOGY

## 2024-10-30 PROCEDURE — G2211 COMPLEX E/M VISIT ADD ON: HCPCS | Performed by: PSYCHIATRY & NEUROLOGY

## 2024-10-30 PROCEDURE — 84425 ASSAY OF VITAMIN B-1: CPT | Mod: 90 | Performed by: PATHOLOGY

## 2024-10-30 PROCEDURE — 84446 ASSAY OF VITAMIN E: CPT | Mod: 90 | Performed by: PATHOLOGY

## 2024-10-30 PROCEDURE — 84443 ASSAY THYROID STIM HORMONE: CPT | Performed by: PATHOLOGY

## 2024-10-30 PROCEDURE — 84165 PROTEIN E-PHORESIS SERUM: CPT | Mod: 26 | Performed by: STUDENT IN AN ORGANIZED HEALTH CARE EDUCATION/TRAINING PROGRAM

## 2024-10-30 PROCEDURE — 84155 ASSAY OF PROTEIN SERUM: CPT | Performed by: PATHOLOGY

## 2024-10-30 PROCEDURE — 82607 VITAMIN B-12: CPT | Performed by: PSYCHIATRY & NEUROLOGY

## 2024-10-30 PROCEDURE — 86334 IMMUNOFIX E-PHORESIS SERUM: CPT | Mod: 26 | Performed by: STUDENT IN AN ORGANIZED HEALTH CARE EDUCATION/TRAINING PROGRAM

## 2024-10-30 PROCEDURE — 86334 IMMUNOFIX E-PHORESIS SERUM: CPT | Performed by: STUDENT IN AN ORGANIZED HEALTH CARE EDUCATION/TRAINING PROGRAM

## 2024-10-30 NOTE — PATIENT INSTRUCTIONS
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  I would want you to obtain an image of the brain to make sure no tiny strokes of the pontine/brainstem region have occurred, and led to imbalance. Otherwise, serum studies today would help you investigate other reasons for neuropathy beyond your prior chemotherapy, and of which may be treatable.    - MRI brain w/wout contrast  - B12, B1, B6, Vitamin D, Vitamin E, TSH, SPEP, Immunofixation    If your symptoms of imbalance were linked or changed to include weakness, or sensory changes, then I would ask you to obtain a nerve and muscle study.  For now, I would hold off on that recommendation.

## 2024-10-30 NOTE — PROGRESS NOTES
"[facility] Neurology Consultation    Beryl Mccoy MRN# [id number]   Age: 74 year old YOB: 1950     Requesting physician: Vinicius Hillman     Reason for Consultation: ataxia      History of Presenting Symptoms:   Beryl Mccoy is a 74 year old female who presents today for evaluation of ataxia.  The patient has a pertinent medical history of CAD s/p CABGx3, breast cancer s/p radiation to chest wall/axilla, left mastectomy, and 2017 - 2018 chemo (Adriamycin + Cytoxan for 4 cycles, taxol for 12 weeks), bipolar type 1, ADHD, cataracts b/l, HTN, HLD, medial epicondylitis with cubital tunnel syndrome (see 8/13/2024 orthopedics), and paroxysmal A-fib.    The patient was seen with Neurosurgery providers 2/29/2204 for issues of losing balance, feeling wobbly for 3+ years, along with ongoing numbness in her feet since chemotherapy 5 years prior.  She reported having JACQUELINE a month prior, helping her back pain for a few weeks.  She did have prior CT lumbar spine showing severe L2-3 spinal canal stenosis 2/2 caudally directed right central disc herniation (5/31/2022 CT lumbar spine) which was reviewed.  Exam was overall normal, except for poor narrow based stance and heel-toe walk along with sensory deficit bottoms of feet b/l.  It was thought her exam may be more consistent with neuropathy given her imbalance, sensory loss, issues after chemotherapy as onset, and clinical reporting.    Today, the patient has had wobbliness for a 2-3 years now and has fallen at times (unsure of if related to wobbliness).  She describes a feeling of almost falling over when walking, going on steps, getting off the couch.  She feels like she is about to fall over from her top moving in a direction, but not that it is pushed or pulled in a way.  She just feel her body \"wants to fall over\".   Her arms to not feel wobbly, and her voice doesn't feel wobbly.  There is no dystonia of the hands or legs, there is no slowed " "motions, there is no tremor.  About 6 months ago, she started developing a \"tic\" in the chin musculature leading to her lower lip moving up and down over and over but in a minimal amplitude and not including her tongue.      Her recent elbow numbness on the left when laying in bed is not associated with her wobbliness.  There is pain in the lateral anterior part of her forearm on the left when laying down at night.  She wears Childrens inflatable swimming devices to avoid compression at her elbows at night.  She does use a walker occasionally for help with balance, but stopped using it recently as she has had improvements in wobbliness after attending PT for at least 3 months.     Social History:   Quit smoking 1977. No alcohol abuse history.      Medications:   Apixaban  Semaglutide  Hydralazine  Lamotrigine 150 mg every day  Letrozole 2.5 mg every day  Losartan  Sertraline 100 mg every day  Spironolactone 25 mg every day  Torsemide 20 mg every day     Physical Exam:   Vitals: /76 (BP Location: Right arm, Patient Position: Sitting, Cuff Size: Adult Large)   Pulse 68   Ht 1.6 m (5' 3\")   Wt 102 kg (224 lb 14.4 oz)   SpO2 96%   BMI 39.84 kg/m     General: Seated comfortably in no acute distress.  Neurologic:     Mental Status: Fully alert, attentive and oriented. Speech clear and fluent, no paraphasic errors.     Cranial Nerves: Visual fields intact. PERRL. EOMI with normal smooth pursuit. Facial sensation intact/symmetric. Facial movements symmetric. Hearing not formally tested but intact to conversation. Palate elevation symmetric, uvula midline. No dysarthria. Shoulder shrug strong bilaterally. Tongue protrusion midline.     Motor: No tremors or other abnormal movements observed. Muscle tone normal throughout. No pronator drift. Normal/symmetric rapid finger tapping. Strength 5/5 throughout upper and lower extremities.     Deep Tendon Reflexes: 2+/symmetric throughout upper extremities, patellar are 1+ " b/l and achilles are absent. No clonus. Toes downgoing bilaterally.     Sensory: Intact/symmetric to vibration and proprioception throughout upper and lower extremities. Pinprick and light touch differentiation is poor in distal toes b/l. positive Romberg.      Coordination: Finger-nose-finger and heel-shin intact without dysmetria. Rapid alternating movements intact but slowed b/l at higher speeds.     Gait: stands easily with arms across chest. Normal base, good stride, good arm swing, turns easily.  Tandem with poor balance, takes extra steps to maintain balance at every step.         Data: Pertinent prior to visit   Imaging:  CT head: 2/6/2024:  There is no evidence of intracranial hemorrhage, mass effect, midline shift, acute infarct or hydrocephalus.  There are no abnormal intraaxial or extraaxial fluid collections.  Mild patchy hypodensities involving the periventricular white matter. These are nonspecific, but are most commonly seen as sequelae of chronic small vessel ischemic disease. 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  Ongoing symptoms of imbalance don't seem orthostatic in nature, and occur with situations of limited visualization.  I do think her vascular risk factors are elevated, and that she has mild dysdiadochokinesia in the arms, so an MRI brain may be helpful to look for brainstem or basal ganglia related infarctions contributing to imbalance. Otherwise, her exam is most consistent with neuropathy and at this time she is doing well with symptom resolution by means of PT.  Further testing could be done to look for alternative etiologies to her neuropathy, as this may allow for treatment beyond watching and waiting with fall prevention.    I didn't notice weakness or pain to the point of her needing an EMG, but if new symptoms did develop then this test would be considered.     Plan:  - MRI brain w/wout contrast  - B12, B1, B6, Vitamin D, Vitamin E, TSH, SPEP, Immunofixation    Follow up in Neurology clinic in 3 months, or should new concerns arise.    DAYTON Issa D.O.   of Neurology    Total time today (52 min) in this patient encounter was spent on pre-charting, counseling and/or coordination of care.  The patient is in agreement with this plan and has no further questions.  The longitudinal plan of care for the diagnosis(es)/condition(s) as documented were addressed during this visit. Due to the added complexity in care, I will continue to support Beryl in the subsequent management and with ongoing continuity of care.

## 2024-10-30 NOTE — LETTER
10/30/2024       RE: Beryl Mccoy  [address] [facility] [address]     Dear Colleague,    Thank you for referring your patient, Beryl Mccoy, to the [facility]. Please see a copy of my visit note below.    [facility] Neurology Consultation    Beryl Mccoy MRN# [id number]   Age: 74 year old YOB: 1950     Requesting physician: Vinicius Hillman     Reason for Consultation: ataxia      History of Presenting Symptoms:   Beryl Mccoy is a 74 year old female who presents today for evaluation of ataxia.  The patient has a pertinent medical history of CAD s/p CABGx3, breast cancer s/p radiation to chest wall/axilla, left mastectomy, and 2017 - 2018 chemo (Adriamycin + Cytoxan for 4 cycles, taxol for 12 weeks), bipolar type 1, ADHD, cataracts b/l, HTN, HLD, medial epicondylitis with cubital tunnel syndrome (see 8/13/2024 orthopedics), and paroxysmal A-fib.    The patient was seen with Neurosurgery providers 2/29/2204 for issues of losing balance, feeling wobbly for 3+ years, along with ongoing numbness in her feet since chemotherapy 5 years prior.  She reported having JACQUELINE a month prior, helping her back pain for a few weeks.  She did have prior CT lumbar spine showing severe L2-3 spinal canal stenosis 2/2 caudally directed right central disc herniation (5/31/2022 CT lumbar spine) which was reviewed.  Exam was overall normal, except for poor narrow based stance and heel-toe walk along with sensory deficit bottoms of feet b/l.  It was thought her exam may be more consistent with neuropathy given her imbalance, sensory loss, issues after chemotherapy as onset, and clinical reporting.    Today, the patient has had wobbliness for a 2-3 years now and has fallen at times (unsure of if related to wobbliness).  She describes a feeling of almost falling over when walking, going on steps, getting  "off the couch.  She feels like she is about to fall over from her top moving in a direction, but not that it is pushed or pulled in a way.  She just feel her body \"wants to fall over\".   Her arms to not feel wobbly, and her voice doesn't feel wobbly.  There is no dystonia of the hands or legs, there is no slowed motions, there is no tremor.  About 6 months ago, she started developing a \"tic\" in the chin musculature leading to her lower lip moving up and down over and over but in a minimal amplitude and not including her tongue.      Her recent elbow numbness on the left when laying in bed is not associated with her wobbliness.  There is pain in the lateral anterior part of her forearm on the left when laying down at night.  She wears Childrens inflatable swimming devices to avoid compression at her elbows at night.  She does use a walker occasionally for help with balance, but stopped using it recently as she has had improvements in wobbliness after attending PT for at least 3 months.     Social History:   Quit smoking 1977. No alcohol abuse history.      Medications:   Apixaban  Semaglutide  Hydralazine  Lamotrigine 150 mg every day  Letrozole 2.5 mg every day  Losartan  Sertraline 100 mg every day  Spironolactone 25 mg every day  Torsemide 20 mg every day     Physical Exam:   Vitals: /76 (BP Location: Right arm, Patient Position: Sitting, Cuff Size: Adult Large)   Pulse 68   Ht 1.6 m (5' 3\")   Wt 102 kg (224 lb 14.4 oz)   SpO2 96%   BMI 39.84 kg/m     General: Seated comfortably in no acute distress.  Neurologic:     Mental Status: Fully alert, attentive and oriented. Speech clear and fluent, no paraphasic errors.     Cranial Nerves: Visual fields intact. PERRL. EOMI with normal smooth pursuit. Facial sensation intact/symmetric. Facial movements symmetric. Hearing not formally tested but intact to conversation. Palate elevation symmetric, uvula midline. No dysarthria. Shoulder shrug strong bilaterally. " Tongue protrusion midline.     Motor: No tremors or other abnormal movements observed. Muscle tone normal throughout. No pronator drift. Normal/symmetric rapid finger tapping. Strength 5/5 throughout upper and lower extremities.     Deep Tendon Reflexes: 2+/symmetric throughout upper extremities, patellar are 1+ b/l and achilles are absent. No clonus. Toes downgoing bilaterally.     Sensory: Intact/symmetric to vibration and proprioception throughout upper and lower extremities. Pinprick and light touch differentiation is poor in distal toes b/l. positive Romberg.      Coordination: Finger-nose-finger and heel-shin intact without dysmetria. Rapid alternating movements intact but slowed b/l at higher speeds.     Gait: stands easily with arms across chest. Normal base, good stride, good arm swing, turns easily.  Tandem with poor balance, takes extra steps to maintain balance at every step.         Data: Pertinent prior to visit   Imaging:  CT head: 2/6/2024:  There is no evidence of intracranial hemorrhage, mass effect, midline shift, acute infarct or hydrocephalus.  There are no abnormal intraaxial or extraaxial fluid collections.  Mild patchy hypodensities involving the periventricular white matter. These are nonspecific, but are most commonly seen as sequelae of chronic small vessel ischemic disease. 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  Ongoing symptoms of imbalance don't seem orthostatic in nature, and occur with situations of limited visualization.  I do think her vascular risk factors are elevated, and that she has mild dysdiadochokinesia in the arms, so an MRI brain may be helpful to look for brainstem or basal ganglia related infarctions  contributing to imbalance. Otherwise, her exam is most consistent with neuropathy and at this time she is doing well with symptom resolution by means of PT.  Further testing could be done to look for alternative etiologies to her neuropathy, as this may allow for treatment beyond watching and waiting with fall prevention.   I didn't notice weakness or pain to the point of her needing an EMG, but if new symptoms did develop then this test would be considered.     Plan:  - MRI brain w/wout contrast  - B12, B1, B6, Vitamin D, Vitamin E, TSH, SPEP, Immunofixation    Follow up in Neurology clinic in 3 months, or should new concerns arise.    DAYTON Issa D.O.   of Neurology    Total time today (52 min) in this patient encounter was spent on pre-charting, counseling and/or coordination of care.  The patient is in agreement with this plan and has no further questions.  The longitudinal plan of care for the diagnosis(es)/condition(s) as documented were addressed during this visit. Due to the added complexity in care, I will continue to support Beryl in the subsequent management and with ongoing continuity of care.        Again, thank you for allowing me to participate in the care of your patient.      Sincerely,    Henrik Issa, DO

## 2024-10-31 LAB
ALBUMIN SERPL ELPH-MCNC: 4.1 G/DL (ref 3.7–5.1)
ALPHA1 GLOB SERPL ELPH-MCNC: 0.3 G/DL (ref 0.2–0.4)
ALPHA2 GLOB SERPL ELPH-MCNC: 1.1 G/DL (ref 0.5–0.9)
B-GLOBULIN SERPL ELPH-MCNC: 1 G/DL (ref 0.6–1)
GAMMA GLOB SERPL ELPH-MCNC: 0.8 G/DL (ref 0.7–1.6)
LOCATION OF TASK: ABNORMAL
LOCATION OF TASK: NORMAL
M PROTEIN SERPL ELPH-MCNC: 0 G/DL
PROT PATTERN SERPL ELPH-IMP: ABNORMAL
PROT PATTERN SERPL IFE-IMP: NORMAL

## 2024-11-01 LAB
A-TOCOPHEROL VIT E SERPL-MCNC: 9.8 MG/L
BETA+GAMMA TOCOPHEROL SERPL-MCNC: 0.3 MG/L

## 2024-11-03 LAB — PYRIDOXAL PHOS SERPL-SCNC: 226.5 NMOL/L

## 2024-11-04 LAB — VIT B1 PYROPHOSHATE BLD-SCNC: 120 NMOL/L

## 2025-01-09 ENCOUNTER — TELEPHONE (OUTPATIENT)
Dept: ENDOCRINOLOGY | Facility: CLINIC | Age: 75
End: 2025-01-09
Payer: COMMERCIAL

## 2025-01-09 NOTE — TELEPHONE ENCOUNTER
Patient confirmed scheduled appointment:     Date: 6/13/2025  Time: 12:30 Pm  Visit type: Return Weight Management  Visit mode: Virtual Visit  Provider:  Tanesha Rivas PA-C  Location: [facility]    Additional Notes:      English

## 2025-02-13 ENCOUNTER — VIRTUAL VISIT (OUTPATIENT)
Dept: PHARMACY | Facility: CLINIC | Age: 75
End: 2025-02-13
Payer: COMMERCIAL

## 2025-02-13 VITALS — WEIGHT: 228 LBS | BODY MASS INDEX: 40.4 KG/M2 | HEIGHT: 63 IN

## 2025-02-13 DIAGNOSIS — I10 ESSENTIAL HYPERTENSION: Chronic | ICD-10-CM

## 2025-02-13 DIAGNOSIS — Z95.1 S/P CABG X 3: ICD-10-CM

## 2025-02-13 DIAGNOSIS — I48.0 PAF (PAROXYSMAL ATRIAL FIBRILLATION) (H): ICD-10-CM

## 2025-02-13 DIAGNOSIS — E78.5 HYPERLIPIDEMIA, UNSPECIFIED HYPERLIPIDEMIA TYPE: Chronic | ICD-10-CM

## 2025-02-13 DIAGNOSIS — C77.3 BREAST CANCER METASTASIZED TO AXILLARY LYMPH NODE, LEFT (H): ICD-10-CM

## 2025-02-13 DIAGNOSIS — F31.9 BIPOLAR 1 DISORDER (H): ICD-10-CM

## 2025-02-13 DIAGNOSIS — K59.00 CONSTIPATION, UNSPECIFIED CONSTIPATION TYPE: ICD-10-CM

## 2025-02-13 DIAGNOSIS — E66.01 CLASS 3 SEVERE OBESITY WITH SERIOUS COMORBIDITY AND BODY MASS INDEX (BMI) OF 40.0 TO 44.9 IN ADULT, UNSPECIFIED OBESITY TYPE (H): Primary | ICD-10-CM

## 2025-02-13 DIAGNOSIS — E66.813 CLASS 3 SEVERE OBESITY WITH SERIOUS COMORBIDITY AND BODY MASS INDEX (BMI) OF 40.0 TO 44.9 IN ADULT, UNSPECIFIED OBESITY TYPE (H): Primary | ICD-10-CM

## 2025-02-13 DIAGNOSIS — C50.912 BREAST CANCER METASTASIZED TO AXILLARY LYMPH NODE, LEFT (H): ICD-10-CM

## 2025-02-13 DIAGNOSIS — I25.10 CORONARY ARTERY DISEASE INVOLVING NATIVE CORONARY ARTERY OF NATIVE HEART WITHOUT ANGINA PECTORIS: ICD-10-CM

## 2025-02-13 RX ORDER — POLYETHYLENE GLYCOL 3350 17 G/17G
1 POWDER, FOR SOLUTION ORAL DAILY
COMMUNITY

## 2025-02-13 RX ORDER — SOTALOL HYDROCHLORIDE 120 MG/1
120 TABLET ORAL 2 TIMES DAILY
COMMUNITY
Start: 2025-01-02

## 2025-02-13 RX ORDER — ATORVASTATIN CALCIUM 20 MG/1
20 TABLET, FILM COATED ORAL DAILY
COMMUNITY

## 2025-02-13 ASSESSMENT — PAIN SCALES - GENERAL: PAINLEVEL_OUTOF10: NO PAIN (0)

## 2025-02-13 NOTE — PROGRESS NOTES
Medication Therapy Management (MTM) Encounter    ASSESSMENT:                            Medication Adherence/Access: No issues identified.    Weight Management   Weight loss has been progressing a bit. She would like to increase exercise and continue wegovy 0.5 mg/week for at least another month before going up on the dose. Discussed general protein goal and ways to increase protein intake. Will send ideas over [employer].     Hypertension /CAD with hx of CABGx3/paroxysmal afib:  Last clinic blood pressure was <130/80. She'll send more readings over [employer]. Discussed monitoring for feeling dizzy or lightheaded as she loses weight in case blood pressure meds need to be adjusted in the future.    Hyperlipidemia   stable    Constipation   Would benefit from using Miralax daily     Mental Health   stable    History of breast cancer:   Follow oncology plan of care    PLAN:                            Will switch from compounded semaglutide to Wegovy again   Continue Wegovy 0.5 mg once weekly for at least 1 more month, then have the option to increase to 1 mg/week if tolerating it well   Use miralax 17g (1 scoop) once daily   Send blood pressure readings over [employer]  Will send protein ideas over [employer]   Increase aerobic exercise to 15 min 5 days per week- try youPeppercoinube workout videos    Follow-up: 6 weeks with medication therapy management, 4 months with weight management     SUBJECTIVE/OBJECTIVE:                          Beryl Mccoy is a 74 year old female seen for an initial visit. She was referred to me from Tanesha Rivas PA-C.     Reason for visit: semaglutide follow-up.    Allergies/ADRs: Reviewed in chart  Past Medical History: Reviewed in chart  Tobacco: She reports that she quit smoking about 47 years ago. Her smoking use included cigarettes. She started smoking about 55 years ago. She has never used smokeless tobacco.  Alcohol: not currently using  Medication Adherence/Access:   Wegovy was approved on insurance  "but it was expensive so patient elected to try compounded semaglutide instead. She is now aware of 2000 out-of-pocket deductible for the year and would like to switch back to Wegovy moving forward.    Weight Management   Compounded semaglutide 0.5 mg (10 units) once weekly - injections on Thursdays. Has been on for about 1 month.     Ongoing constipation even before starting semaglutide. Feels a little queasy a day after an injection. Wegovy has been helpful for reducing hunger and cravings. She's getting full faster. Has a hard time feeling full when not taking semaglutide.     Nutrition/Eating Habits: eating 5 times per day (3 meals, plus 2 snacks).  incorporates fruits and vegetables. Protein: chicken, yogurt. Doesn't like taste of protein powder supplements.  Water: drinking around 64 ounces per day.   Exercise/Activity: stretching and core strengthening exercising. Likes to walk but not when cold out. Interested in increasing aerobic exercise.     Initial Consult Weight: 249 lbs     Current weight today: 228 lbs 0 oz  Cumulative Weight Loss: -21 lb, -8.4% from baseline    Wt Readings from Last 4 Encounters:   02/13/25 228 lb (103.4 kg)   01/02/25 233 lb (105.7 kg)   10/30/24 224 lb 14.4 oz (102 kg)   06/19/24 235 lb (106.6 kg)     Estimated body mass index is 40.39 kg/m  as calculated from the following:    Height as of this encounter: 5' 3\" (1.6 m).    Weight as of this encounter: 228 lb (103.4 kg).      Hypertension /CAD with hx of CABGx3/paroxysmal afib:  Apixaban 5 mg 2 times daily   Hydralazine 50 mg 2 times daily   Losartan 100 mg once daily   Potassium chloride 20 MEQ once daily   Sotalol 120 mg 2 times daily   Spironolactone 25 mg once daily   Torsemide 20 mg once daily   Patient reports no current medication side effects  No updated blood pressure readings today. Following with cardiology.         Hyperlipidemia   Atorvastatin 20 mg once daily   No side effects. Had myalgia on higher dose (80 mg/day) " "which resolved after decreasing the dose.      Recent Labs   Lab Test 05/29/24  1248   CHOL 90   HDL 38*   LDL 34   TRIG 90       Constipation   Miralax 17 g once daily as needed     Patient feels that current therapy is effective when she takes it. More constipation when not using consistently.        Mental Health   Bipolar 1 disorder  Lamotrigine 100 mg once daily   Sertraline 100 mg once daily   Patient reports no current medication side effects.  Patient reports symptoms are good.       History of breast cancer:   Letrozole 2.5 mg once daily   No side effects. Following with oncology for monitoring       Today's Vitals: Ht 5' 3\" (1.6 m)   Wt 228 lb (103.4 kg)   BMI 40.39 kg/m    ----------------    I spent 35 minutes with this patient today. All changes were made via collaborative practice agreement with Tanesha Rivas PA-C.     A summary of these recommendations was sent via [facility].      Telemedicine Visit Details  The patient's medications can be safely assessed via a telemedicine encounter.  Type of service:  Telephone visit  Originating Location (pt. Location): Home    Distant Location (provider location):  Off-site  Start Time: 10:35 AM  End Time:  11:10 AM      Medication Therapy Recommendations  Class 3 severe obesity with serious comorbidity and body mass index (BMI) of 40.0 to 44.9 in adult, unspecified obesity type (H)   1 Current Medication: COMPOUNDED NON-CONTROLLED SUBSTANCE (CMPD RX) - PHARMACY TO MIX COMPOUNDED MEDICATION (Discontinued)   Current Medication Sig: Compounded semaglutide 0.25mg subcutaneous injection once weekly for 4 weeks   Rationale: More cost-effective medication available - Cost - Adherence   Recommendation: Change Medication Formulation    Status: Accepted per CPA   Identified Date: 2/13/2025 Completed Date: 2/13/2025              "

## 2025-02-13 NOTE — NURSING NOTE
Current patient location: [address] [place] [id number]    Is the patient currently in the state of [place]? YES    Visit mode: TELEPHONE    If the visit is dropped, the patient can be reconnected by:TELEPHONE VISIT: Phone number:   Telephone Information:   Mobile [phone]       Will anyone else be joining the visit? NO  (If patient encounters technical issues they should call [phone]    Are changes needed to the allergy or medication list? No    Are refills needed on medications prescribed by this physician? NO    Rooming Documentation:  Questionnaire(s) not pre-assigned    Reason for visit: Medication Therapy Management    Farrah OLVERA

## 2025-02-13 NOTE — PATIENT INSTRUCTIONS
"Recommendations from today's MTM visit:                                                      Will switch from compounded semaglutide to Wegovy again   Continue Wegovy 0.5 mg once weekly for at least 1 more month, then have the option to increase to 1 mg/week if tolerating it well   Use miralax 17g (1 scoop) once daily   Send blood pressure readings over mychart  Will send protein ideas over mychart     Protein Supplements:   Unflavored protein powder: Genepro, Isopure (25-30 gm protein per 1 scoop); Vital Proteins collagen powder (1 tbsp = 5 gm pro)  You may also use flavored protein powder if you prefer.   Protein shots: https://[email or web address]/[email or web address]-shots  Pre-made protein shakes (no more than 210 Calories, at least 20 grams of protein, and less than 10 grams of sugar):   Premier Protein (160 Calories, 30 g protein)  Boost/Ensure Max (160 calories, 30 gm protein)   Fairlife Core Power (170 calories, 26 gm protein)  Aldi's Elevation Protein Shake (160 calories, 30 gm protein)   Equate Protein Shake (160 calories, 30 gm protein)  Slim Fast Advanced Nutrition (180 Calories, 20 g protein)  Muscle Milk, lactose-free, 17 oz bottle (210 Calories, 30 g protein)  Glucerna Protein Smart (150 anil, 30 gm protein)  Clear Protein Drinks:  BiPro  Premier Protein clear  [id number]  M Health Protein 15 Concentrate  Bone broth  Beneprotein protein powder mixed with 4-6 oz of fluid  [id number]  Gatorade Zero with Protein   Vital Proteins collagen powder mixed with clear fluid    Quick/Easy Protein Sources:  Hard boiled eggs  Part-skim cheese sticks  Baby Bell cheese rounds  Low-fat/low-sugar Greek yogurt  Low-fat cottage cheese  Lean deli meat (chicken/turkey/ham)  Roasted chickpeas or lentils  Nuts   Turkey meat stick  Protein shake/bar  \"P3\" snack (cheese, nuts, deli meat)  Aldi's \"Protein Bread\"   \"Egglife\" egg white wrap    Tuna/salmon can/packet       Follow-up: 6 weeks with medication therapy management, 4 " "months with weight management     It was great speaking with you today.  I value your experience and would be very thankful for your time in providing feedback in our clinic survey. In the next few days, you may receive an email or text message from [employer] with a link to a survey related to your  clinical pharmacist.\"     To schedule another MTM appointment, please call the clinic directly or you may call the MTM scheduling line at [phone].    My Clinical Pharmacist's contact information:                                                      Please feel free to contact me with any questions or concerns you have.      Audra Man, PharmD, [facility]  Medication Therapy Management Pharmacist      "

## 2025-04-11 ENCOUNTER — TELEPHONE (OUTPATIENT)
Dept: ENDOCRINOLOGY | Facility: CLINIC | Age: 75
End: 2025-04-11
Payer: COMMERCIAL

## 2025-04-11 NOTE — TELEPHONE ENCOUNTER
"Prior Authorization Retail Medication Request    Medication/Dose: Wegovy 1 mg once weekly  Diagnosis and ICD code (if different than what is on RX):    New/renewal/insurance change PA/secondary ins. PA:  Previously Tried and Failed:  diet and exercise for at least 3 months without clinically effective sustainable weight loss, Phentermine: contraindicated due to CAD, and Qsymia (phentermine/topiramate): contraindicated due to CAD  Rationale: Beryl Mccoy is a 74 year old female with a diagnosis of Class III Obesity (BMI at least 40 kg/m2), Cardiovascular disease (history of myocardial infarction), and moderate Obstructive Sleep Apnea.     Estimated body mass index is 40.03 kg/m  as calculated from the following:    Height as of an earlier encounter on 4/11/25: 5' 3\" (1.6 m).    Weight as of an earlier encounter on 4/11/25: 226 lb (102.5 kg).     Insurance   Primary: [employer]  Insurance ID:  [id number]     "

## 2025-04-11 NOTE — TELEPHONE ENCOUNTER
PA Initiation    Medication: WEGOVY 1 MG/0.5ML SC SOAJ  Insurance Company: [employer] - Phone [phone] Fax [phone]  Pharmacy Filling the Rx: [id number] - [place], [place] - [address] AT [address] OF [facility]  Filling Pharmacy Phone: [phone]  Filling Pharmacy Fax: [phone]  Start Date: 4/11/2025

## 2025-04-14 NOTE — TELEPHONE ENCOUNTER
PA Approved for Wegovy. Order sent to pharmacy and [facility] message sent to patient. Closing encounter

## 2025-04-14 NOTE — TELEPHONE ENCOUNTER
Prior Authorization Approval    Medication: WEGOVY 1 MG/0.5ML SC SOAJ  Authorization Effective Date: 3/11/2025  Authorization Expiration Date: 4/11/2026  Approved Dose/Quantity: 1 month  Reference #: [id number]   Insurance Company: [employer] - Phone [phone] Fax [phone]  Expected CoPay: $    CoPay Card Available:      Financial Assistance Needed:    Which Pharmacy is filling the prescription: [id number] - [place], [place] - [facility] AT [facility]  Pharmacy Notified:  order sent to pharmacy  Patient Notified:  mychart sent to pt

## 2025-07-14 DIAGNOSIS — K59.00 CONSTIPATION, UNSPECIFIED CONSTIPATION TYPE: ICD-10-CM

## 2025-07-16 RX ORDER — DOCUSATE SODIUM 100 MG/1
100 CAPSULE, LIQUID FILLED ORAL DAILY PRN
Qty: 90 CAPSULE | Refills: 0 | Status: CANCELLED | OUTPATIENT
Start: 2025-07-16

## 2025-07-17 NOTE — TELEPHONE ENCOUNTER
Last Written Prescription:     docusate sodium (COLACE) 100 MG capsule 90 capsule 0 4/11/2025 -- No   Sig - Route: Take 1 capsule (100 mg) by mouth daily as needed for constipation. - Oral     ----------------------  Last Visit Date: 1/2/25  Future Visit Date: 11/8/25 miguel angel  Refill decision:   [x] Medication unable to be refilled by RN due to: Medication not included in refill protocol policy   docusate sodium (COLACE) 100 MG capsule        Request from pharmacy:  Requested Prescriptions   Pending Prescriptions Disp Refills    docusate sodium (COLACE) 100 MG capsule 90 capsule 0     Sig: Take 1 capsule (100 mg) by mouth daily as needed for constipation.       Laxatives Protocol Passed - 7/17/2025 10:43 AM        Passed - Patient is age 6 or older        Passed - Medication is active on med list and the sig matches. RN to manually verify dose and sig if red X/fail.     If the protocol passes (green check), you do not need to verify med dose and sig.    A prescription matches if they are the same clinical intention.    For Example: once daily and every morning are the same.    The protocol can not identify upper and lower case letters as matching and will fail.     For Example: Take 1 tablet (50 mg) by mouth daily     TAKE 1 TABLET (50 MG) BY MOUTH DAILY    For all fails (red x), verify dose and sig.    If the refill does match what is on file, the RN can still proceed to approve the refill request.       If they do not match, route to the appropriate provider.             Passed - Medication indicated for associated diagnosis     The medication is prescribed for one or more of the following conditions:     Constipation   Preparation of bowel for procedure   Fecal impaction   Dysfunctional voiding   Narcotic induced constipation   Cystic Fibrosis  Bronchiectasis            Passed - Recent (12 month) or future (90 days) visit with the authorizing provider's specialty (provided they have been seen in the past 15 months)      The patient must have completed an in-person or virtual visit within the past 12 months or has a future visit scheduled within the next 90 days with the authorizing provider s specialty.  Urgent care and e-visits do not qualify as an office visit for this protocol.

## 2025-09-02 ENCOUNTER — VIRTUAL VISIT (OUTPATIENT)
Dept: ENDOCRINOLOGY | Facility: CLINIC | Age: 75
End: 2025-09-02
Payer: COMMERCIAL

## 2025-09-02 VITALS — BODY MASS INDEX: 38.27 KG/M2 | HEIGHT: 63 IN | WEIGHT: 216 LBS

## 2025-09-02 DIAGNOSIS — D50.8 OTHER IRON DEFICIENCY ANEMIA: ICD-10-CM

## 2025-09-02 DIAGNOSIS — E66.813 CLASS 3 SEVERE OBESITY WITH SERIOUS COMORBIDITY AND BODY MASS INDEX (BMI) OF 40.0 TO 44.9 IN ADULT, UNSPECIFIED OBESITY TYPE (H): Primary | ICD-10-CM

## 2025-09-02 DIAGNOSIS — Z95.1 S/P CABG X 3: ICD-10-CM

## 2025-09-02 DIAGNOSIS — R53.83 FATIGUE, UNSPECIFIED TYPE: ICD-10-CM

## 2025-09-02 DIAGNOSIS — R73.03 PRE-DIABETES: ICD-10-CM

## 2025-09-02 ASSESSMENT — PAIN SCALES - GENERAL: PAINLEVEL_OUTOF10: NO PAIN (0)
